# Patient Record
Sex: MALE | Race: WHITE | Employment: FULL TIME | URBAN - METROPOLITAN AREA
[De-identification: names, ages, dates, MRNs, and addresses within clinical notes are randomized per-mention and may not be internally consistent; named-entity substitution may affect disease eponyms.]

---

## 2019-04-20 ENCOUNTER — HOSPITAL ENCOUNTER (EMERGENCY)
Age: 54
Discharge: HOME | End: 2019-04-21
Attending: EMERGENCY MEDICINE
Payer: COMMERCIAL

## 2019-04-20 VITALS
SYSTOLIC BLOOD PRESSURE: 143 MMHG | HEART RATE: 69 BPM | DIASTOLIC BLOOD PRESSURE: 76 MMHG | OXYGEN SATURATION: 97 % | RESPIRATION RATE: 18 BRPM | BODY MASS INDEX: 31.69 KG/M2 | WEIGHT: 234 LBS | HEIGHT: 72 IN

## 2019-04-20 DIAGNOSIS — E16.2 HYPOGLYCEMIA: Primary | ICD-10-CM

## 2019-04-20 LAB
CHP ED QC CHECK: NORMAL
GLUCOSE BLD-MCNC: 81 MG/DL
GLUCOSE BLD-MCNC: 92 MG/DL
GLUCOSE BLD-MCNC: 96 MG/DL
METER GLUCOSE: 213 MG/DL (ref 74–99)
METER GLUCOSE: 80 MG/DL (ref 74–99)
METER GLUCOSE: 81 MG/DL (ref 74–99)
METER GLUCOSE: 92 MG/DL (ref 74–99)
METER GLUCOSE: 96 MG/DL (ref 74–99)

## 2019-04-20 PROCEDURE — 99285 EMERGENCY DEPT VISIT HI MDM: CPT

## 2019-04-20 PROCEDURE — 82962 GLUCOSE BLOOD TEST: CPT

## 2019-04-20 PROCEDURE — 93005 ELECTROCARDIOGRAM TRACING: CPT | Performed by: EMERGENCY MEDICINE

## 2019-04-20 RX ORDER — METOPROLOL SUCCINATE 50 MG
TABLET, EXTENDED RELEASE 24 HR ORAL
COMMUNITY
Start: 2019-03-22

## 2019-04-20 RX ORDER — LISINOPRIL 10 MG/1
TABLET ORAL
Refills: 3 | COMMUNITY
Start: 2019-03-30

## 2019-04-20 ASSESSMENT — ENCOUNTER SYMPTOMS
SHORTNESS OF BREATH: 0
VOMITING: 0
COLOR CHANGE: 0
SORE THROAT: 0
ABDOMINAL PAIN: 0
NAUSEA: 0
COUGH: 0

## 2019-04-20 NOTE — ED PROVIDER NOTES
Maribel Yusuf is a 52 y.o. male with PMH significant for HTN and DM presenting to the emergency department for hypoglycemia. Patient states that he felt diaphoretic, with vision changes and with generalized weakness. He checked his blood sugar and it read low. He called EMS. EMS reported that his blood sugar was 41 on arrival. They gave him half amp of D50 with improvement of his blood sugar in route that showed greater than 120. Patient arrived with blood sugar of 96. On arrival, patient is alert and oriented to himself, year and location. Patient denies any symptoms at this time. Patient admits to history of diabetes as he is on metformin and another diabetic medication, however he cannot read medication. Patient admits to drinking, however no drinking today. The history is provided by the patient. Hypoglycemia   Severity:  Mild  Onset quality:  Sudden  Progression:  Partially resolved  Associated symptoms: sweats and weakness    Associated symptoms: no altered mental status, no seizures, no shortness of breath, no speech difficulty and no vomiting    Risk factors: alcohol abuse        Review of Systems   Constitutional: Positive for diaphoresis and fatigue. Negative for chills and fever. HENT: Negative for congestion and sore throat. Eyes: Negative for visual disturbance. Respiratory: Negative for cough and shortness of breath. Cardiovascular: Negative for chest pain. Gastrointestinal: Negative for abdominal pain, nausea and vomiting. Genitourinary: Negative for difficulty urinating. Musculoskeletal: Negative for neck pain. Skin: Negative for color change. Neurological: Positive for weakness and light-headedness. Negative for seizures, speech difficulty and headaches. Psychiatric/Behavioral: Negative for confusion. Physical Exam   Constitutional: He is oriented to person, place, and time. He appears well-developed and well-nourished. No distress.    HENT:   Head: ---------------------------------------------  Past Medical History:  has a past medical history of Hypertension. Past Surgical History:  has a past surgical history that includes Femur fracture surgery; Tonsillectomy; and ECHO Compl W Dop Color Flow (1/17/2012). Social History:  reports that he has quit smoking. He has never used smokeless tobacco. He reports that he drinks alcohol. He reports that he does not use drugs. Family History: family history includes Heart Disease in his father. The patients home medications have been reviewed. Allergies: Patient has no known allergies.     -------------------------------------------------- RESULTS -------------------------------------------------  Labs:  Results for orders placed or performed during the hospital encounter of 04/20/19   POCT Glucose   Result Value Ref Range    Glucose 96 mg/dL    QC OK? ok    POCT Glucose   Result Value Ref Range    Meter Glucose 96 74 - 99 mg/dL   POCT Glucose   Result Value Ref Range    Glucose 92 mg/dL    QC OK? ok    POCT Glucose   Result Value Ref Range    Glucose 81 mg/dL    QC OK? ok    POCT Glucose   Result Value Ref Range    Meter Glucose 92 74 - 99 mg/dL   POCT Glucose   Result Value Ref Range    Meter Glucose 81 74 - 99 mg/dL   POCT Glucose   Result Value Ref Range    Meter Glucose 80 74 - 99 mg/dL   POCT Glucose   Result Value Ref Range    Meter Glucose 213 (H) 74 - 99 mg/dL   EKG 12 Lead   Result Value Ref Range    Ventricular Rate 72 BPM    Atrial Rate 72 BPM    P-R Interval 280 ms    QRS Duration 160 ms    Q-T Interval 462 ms    QTc Calculation (Bazett) 505 ms    P Axis 54 degrees    R Axis -74 degrees    T Axis 36 degrees       Radiology:  No orders to display         ------------------------- NURSING NOTES AND VITALS REVIEWED ---------------------------  Date / Time Roomed:  4/20/2019  6:57 PM  ED Bed Assignment:  27/27    The nursing notes within the ED encounter and vital signs as below have been reviewed. BP (!) 143/76   Pulse 69   Resp 18   Ht 6' (1.829 m)   Wt 234 lb (106.1 kg)   SpO2 97%   BMI 31.74 kg/m²   Oxygen Saturation Interpretation: Normal      ------------------------------------------ PROGRESS NOTES ------------------------------------------  ED COURSE MEDICATIONS:              Medications - No data to display    I have spoken with the patient and discussed todays results, in addition to providing specific details for the plan of care and counseling regarding the diagnosis and prognosis. Their questions are answered at this time and they are agreeable with the plan. I discussed at length with them reasons for immediate return here for re evaluation. They will followup with primary care by calling their office tomorrow. --------------------------------- ADDITIONAL PROVIDER NOTES ---------------------------------  At this time the patient is without objective evidence of an acute process requiring hospitalization or inpatient management. They have remained hemodynamically stable throughout their entire ED visit and are stable for discharge with outpatient follow-up. The plan has been discussed in detail and they are aware of the specific conditions for emergent return, as well as the importance of follow-up. Discharge Medication List as of 4/20/2019 11:52 PM          Diagnosis:  1. Hypoglycemia        Disposition:  Patient's disposition: Discharge to home  Patient's condition is stable.             Mika Rodgers DO  Resident  04/21/19 Bibi Swift

## 2019-04-21 NOTE — ED NOTES
Notified Dr Taco Walters of [facility].  Provided snacks to mehreen Cota RN  04/20/19 2128

## 2019-04-22 LAB
EKG ATRIAL RATE: 72 BPM
EKG P AXIS: 54 DEGREES
EKG P-R INTERVAL: 280 MS
EKG Q-T INTERVAL: 462 MS
EKG QRS DURATION: 160 MS
EKG QTC CALCULATION (BAZETT): 505 MS
EKG R AXIS: -74 DEGREES
EKG T AXIS: 36 DEGREES
EKG VENTRICULAR RATE: 72 BPM

## 2020-11-23 ENCOUNTER — HOSPITAL ENCOUNTER (EMERGENCY)
Age: 55
Discharge: HOME | End: 2020-11-23

## 2020-11-23 VITALS
OXYGEN SATURATION: 99 % | HEART RATE: 99 BPM | TEMPERATURE: 96.8 F | RESPIRATION RATE: 16 BRPM | SYSTOLIC BLOOD PRESSURE: 148 MMHG | DIASTOLIC BLOOD PRESSURE: 105 MMHG

## 2020-11-23 PROCEDURE — 4500000002 HC ER NO CHARGE

## 2021-05-14 NOTE — ED NOTES
Pt able to ambulate without complications.  Cab called for ride home      Josef Cota RN  04/21/19 5221 Evaluate and treat

## 2022-11-26 ENCOUNTER — APPOINTMENT (OUTPATIENT)
Dept: CT IMAGING | Age: 57
DRG: 871 | End: 2022-11-26

## 2022-11-26 ENCOUNTER — HOSPITAL ENCOUNTER (INPATIENT)
Age: 57
LOS: 4 days | Discharge: HOME | DRG: 871 | End: 2022-11-30
Attending: STUDENT IN AN ORGANIZED HEALTH CARE EDUCATION/TRAINING PROGRAM | Admitting: EMERGENCY MEDICINE

## 2022-11-26 DIAGNOSIS — E83.42 HYPOMAGNESEMIA: ICD-10-CM

## 2022-11-26 DIAGNOSIS — E87.1 HYPONATREMIA: Primary | ICD-10-CM

## 2022-11-26 DIAGNOSIS — R74.01 TRANSAMINITIS: ICD-10-CM

## 2022-11-26 DIAGNOSIS — E80.6 HYPERBILIRUBINEMIA: ICD-10-CM

## 2022-11-26 DIAGNOSIS — E87.6 HYPOKALEMIA: ICD-10-CM

## 2022-11-26 DIAGNOSIS — J96.01 ACUTE RESPIRATORY FAILURE WITH HYPOXIA (HCC): ICD-10-CM

## 2022-11-26 DIAGNOSIS — J18.9 PNEUMONIA DUE TO INFECTIOUS ORGANISM, UNSPECIFIED LATERALITY, UNSPECIFIED PART OF LUNG: ICD-10-CM

## 2022-11-26 DIAGNOSIS — E87.20 LACTIC ACIDOSIS: ICD-10-CM

## 2022-11-26 DIAGNOSIS — R91.1 PULMONARY NODULE: ICD-10-CM

## 2022-11-26 PROBLEM — R55 SYNCOPE AND COLLAPSE: Status: ACTIVE | Noted: 2022-11-26

## 2022-11-26 LAB
ADENOVIRUS BY PCR: NOT DETECTED
ALBUMIN SERPL-MCNC: 4 G/DL (ref 3.5–5.2)
ALP BLD-CCNC: 138 U/L (ref 40–129)
ALT SERPL-CCNC: 77 U/L (ref 0–40)
ANION GAP SERPL CALCULATED.3IONS-SCNC: 16 MMOL/L (ref 7–16)
ANION GAP SERPL CALCULATED.3IONS-SCNC: 20 MMOL/L (ref 7–16)
AST SERPL-CCNC: 83 U/L (ref 0–39)
B.E.: 1.6 MMOL/L (ref -3–3)
BACTERIA: ABNORMAL /HPF
BASOPHILS ABSOLUTE: 0 E9/L (ref 0–0.2)
BASOPHILS RELATIVE PERCENT: 0.1 % (ref 0–2)
BILIRUB SERPL-MCNC: 1.6 MG/DL (ref 0–1.2)
BILIRUBIN URINE: NEGATIVE
BLOOD, URINE: NEGATIVE
BORDETELLA PARAPERTUSSIS BY PCR: NOT DETECTED
BORDETELLA PERTUSSIS BY PCR: NOT DETECTED
BUN BLDV-MCNC: 11 MG/DL (ref 6–20)
BUN BLDV-MCNC: 9 MG/DL (ref 6–20)
BURR CELLS: ABNORMAL
CALCIUM SERPL-MCNC: 8.2 MG/DL (ref 8.6–10.2)
CALCIUM SERPL-MCNC: 9.3 MG/DL (ref 8.6–10.2)
CHLAMYDOPHILIA PNEUMONIAE BY PCR: NOT DETECTED
CHLORIDE BLD-SCNC: 78 MMOL/L (ref 98–107)
CHLORIDE BLD-SCNC: 81 MMOL/L (ref 98–107)
CHLORIDE URINE RANDOM: <20 MMOL/L
CLARITY: CLEAR
CO2: 22 MMOL/L (ref 22–29)
CO2: 25 MMOL/L (ref 22–29)
COHB: 2.1 % (ref 0–1.5)
COLOR: YELLOW
CORONAVIRUS 229E BY PCR: NOT DETECTED
CORONAVIRUS HKU1 BY PCR: NOT DETECTED
CORONAVIRUS NL63 BY PCR: NOT DETECTED
CORONAVIRUS OC43 BY PCR: NOT DETECTED
CREAT SERPL-MCNC: 1 MG/DL (ref 0.7–1.2)
CREAT SERPL-MCNC: 1.1 MG/DL (ref 0.7–1.2)
CRITICAL: ABNORMAL
DATE ANALYZED: ABNORMAL
DATE OF COLLECTION: ABNORMAL
EOSINOPHILS ABSOLUTE: 0 E9/L (ref 0.05–0.5)
EOSINOPHILS RELATIVE PERCENT: 0 % (ref 0–6)
GFR SERPL CREATININE-BSD FRML MDRD: >60 ML/MIN/1.73
GFR SERPL CREATININE-BSD FRML MDRD: >60 ML/MIN/1.73
GLUCOSE BLD-MCNC: 175 MG/DL (ref 74–99)
GLUCOSE BLD-MCNC: 222 MG/DL (ref 74–99)
GLUCOSE URINE: NEGATIVE MG/DL
HCO3: 23.3 MMOL/L (ref 22–26)
HCT VFR BLD CALC: 42.3 % (ref 37–54)
HEMOGLOBIN: 15.6 G/DL (ref 12.5–16.5)
HHB: 7.5 % (ref 0–5)
HUMAN METAPNEUMOVIRUS BY PCR: NOT DETECTED
HUMAN RHINOVIRUS/ENTEROVIRUS BY PCR: NOT DETECTED
INFLUENZA A BY PCR: NOT DETECTED
INFLUENZA A BY PCR: NOT DETECTED
INFLUENZA B BY PCR: NOT DETECTED
INFLUENZA B BY PCR: NOT DETECTED
KETONES, URINE: 15 MG/DL
LAB: ABNORMAL
LACTIC ACID: 1.9 MMOL/L (ref 0.5–2.2)
LACTIC ACID: 4.5 MMOL/L (ref 0.5–2.2)
LEUKOCYTE ESTERASE, URINE: NEGATIVE
LIPASE: 98 U/L (ref 13–60)
LYMPHOCYTES ABSOLUTE: 0.09 E9/L (ref 1.5–4)
LYMPHOCYTES RELATIVE PERCENT: 0.9 % (ref 20–42)
Lab: ABNORMAL
MAGNESIUM: 1.4 MG/DL (ref 1.6–2.6)
MAGNESIUM: 1.9 MG/DL (ref 1.6–2.6)
MCH RBC QN AUTO: 31.6 PG (ref 26–35)
MCHC RBC AUTO-ENTMCNC: 36.9 % (ref 32–34.5)
MCV RBC AUTO: 85.6 FL (ref 80–99.9)
METHB: 0.3 % (ref 0–1.5)
MODE: ABNORMAL
MONOCYTES ABSOLUTE: 0.28 E9/L (ref 0.1–0.95)
MONOCYTES RELATIVE PERCENT: 2.6 % (ref 2–12)
MYCOPLASMA PNEUMONIAE BY PCR: NOT DETECTED
NEUTROPHILS ABSOLUTE: 9.12 E9/L (ref 1.8–7.3)
NEUTROPHILS RELATIVE PERCENT: 96.5 % (ref 43–80)
NITRITE, URINE: NEGATIVE
O2 CONTENT: 19.4 ML/DL
O2 SATURATION: 92.3 % (ref 92–98.5)
O2HB: 90.1 % (ref 94–97)
OPERATOR ID: 366
OSMOLALITY URINE: 289 MOSM/KG (ref 300–900)
OSMOLALITY: 254 MOSM/KG (ref 285–310)
OVALOCYTES: ABNORMAL
PARAINFLUENZA VIRUS 1 BY PCR: NOT DETECTED
PARAINFLUENZA VIRUS 2 BY PCR: NOT DETECTED
PARAINFLUENZA VIRUS 3 BY PCR: NOT DETECTED
PARAINFLUENZA VIRUS 4 BY PCR: NOT DETECTED
PATIENT TEMP: 37 C
PCO2: 29.2 MMHG (ref 35–45)
PDW BLD-RTO: 11.9 FL (ref 11.5–15)
PH BLOOD GAS: 7.52 (ref 7.35–7.45)
PH UA: 6.5 (ref 5–9)
PLATELET # BLD: 129 E9/L (ref 130–450)
PMV BLD AUTO: 9.4 FL (ref 7–12)
PO2: 61.8 MMHG (ref 75–100)
POIKILOCYTES: ABNORMAL
POTASSIUM REFLEX MAGNESIUM: 3.2 MMOL/L (ref 3.5–5)
POTASSIUM REFLEX MAGNESIUM: 3.2 MMOL/L (ref 3.5–5)
POTASSIUM, UR: 62.4 MMOL/L
PRO-BNP: 161 PG/ML (ref 0–125)
PROCALCITONIN: 2.26 NG/ML (ref 0–0.08)
PROTEIN UA: NEGATIVE MG/DL
RBC # BLD: 4.94 E12/L (ref 3.8–5.8)
RBC UA: ABNORMAL /HPF (ref 0–2)
RESPIRATORY SYNCYTIAL VIRUS BY PCR: NOT DETECTED
SARS-COV-2, NAAT: NOT DETECTED
SARS-COV-2, PCR: NOT DETECTED
SODIUM BLD-SCNC: 119 MMOL/L (ref 132–146)
SODIUM BLD-SCNC: 123 MMOL/L (ref 132–146)
SODIUM URINE: 23 MMOL/L
SOURCE, BLOOD GAS: ABNORMAL
SPECIFIC GRAVITY UA: 1.01 (ref 1–1.03)
THB: 15.3 G/DL (ref 11.5–16.5)
TIME ANALYZED: 1446
TOTAL PROTEIN: 8.7 G/DL (ref 6.4–8.3)
TROPONIN, HIGH SENSITIVITY: 16 NG/L (ref 0–11)
TROPONIN, HIGH SENSITIVITY: 16 NG/L (ref 0–11)
UROBILINOGEN, URINE: 4 E.U./DL
WBC # BLD: 9.4 E9/L (ref 4.5–11.5)
WBC UA: ABNORMAL /HPF (ref 0–5)

## 2022-11-26 PROCEDURE — 87635 SARS-COV-2 COVID-19 AMP PRB: CPT

## 2022-11-26 PROCEDURE — 84484 ASSAY OF TROPONIN QUANT: CPT

## 2022-11-26 PROCEDURE — 93005 ELECTROCARDIOGRAM TRACING: CPT | Performed by: STUDENT IN AN ORGANIZED HEALTH CARE EDUCATION/TRAINING PROGRAM

## 2022-11-26 PROCEDURE — 80053 COMPREHEN METABOLIC PANEL: CPT

## 2022-11-26 PROCEDURE — 87040 BLOOD CULTURE FOR BACTERIA: CPT

## 2022-11-26 PROCEDURE — 87088 URINE BACTERIA CULTURE: CPT

## 2022-11-26 PROCEDURE — 0202U NFCT DS 22 TRGT SARS-COV-2: CPT

## 2022-11-26 PROCEDURE — 6360000002 HC RX W HCPCS: Performed by: STUDENT IN AN ORGANIZED HEALTH CARE EDUCATION/TRAINING PROGRAM

## 2022-11-26 PROCEDURE — 2000000000 HC ICU R&B

## 2022-11-26 PROCEDURE — 80048 BASIC METABOLIC PNL TOTAL CA: CPT

## 2022-11-26 PROCEDURE — 85025 COMPLETE CBC W/AUTO DIFF WBC: CPT

## 2022-11-26 PROCEDURE — 96375 TX/PRO/DX INJ NEW DRUG ADDON: CPT

## 2022-11-26 PROCEDURE — 99285 EMERGENCY DEPT VISIT HI MDM: CPT

## 2022-11-26 PROCEDURE — 71275 CT ANGIOGRAPHY CHEST: CPT

## 2022-11-26 PROCEDURE — 6370000000 HC RX 637 (ALT 250 FOR IP): Performed by: STUDENT IN AN ORGANIZED HEALTH CARE EDUCATION/TRAINING PROGRAM

## 2022-11-26 PROCEDURE — 96374 THER/PROPH/DIAG INJ IV PUSH: CPT

## 2022-11-26 PROCEDURE — 82805 BLOOD GASES W/O2 SATURATION: CPT

## 2022-11-26 PROCEDURE — 83880 ASSAY OF NATRIURETIC PEPTIDE: CPT

## 2022-11-26 PROCEDURE — 83735 ASSAY OF MAGNESIUM: CPT

## 2022-11-26 PROCEDURE — 2580000003 HC RX 258: Performed by: STUDENT IN AN ORGANIZED HEALTH CARE EDUCATION/TRAINING PROGRAM

## 2022-11-26 PROCEDURE — 2580000003 HC RX 258: Performed by: EMERGENCY MEDICINE

## 2022-11-26 PROCEDURE — 82436 ASSAY OF URINE CHLORIDE: CPT

## 2022-11-26 PROCEDURE — 6360000002 HC RX W HCPCS

## 2022-11-26 PROCEDURE — 74177 CT ABD & PELVIS W/CONTRAST: CPT

## 2022-11-26 PROCEDURE — 84133 ASSAY OF URINE POTASSIUM: CPT

## 2022-11-26 PROCEDURE — 2500000003 HC RX 250 WO HCPCS: Performed by: STUDENT IN AN ORGANIZED HEALTH CARE EDUCATION/TRAINING PROGRAM

## 2022-11-26 PROCEDURE — 84300 ASSAY OF URINE SODIUM: CPT

## 2022-11-26 PROCEDURE — 87502 INFLUENZA DNA AMP PROBE: CPT

## 2022-11-26 PROCEDURE — 6360000002 HC RX W HCPCS: Performed by: EMERGENCY MEDICINE

## 2022-11-26 PROCEDURE — 83605 ASSAY OF LACTIC ACID: CPT

## 2022-11-26 PROCEDURE — 83935 ASSAY OF URINE OSMOLALITY: CPT

## 2022-11-26 PROCEDURE — 83930 ASSAY OF BLOOD OSMOLALITY: CPT

## 2022-11-26 PROCEDURE — 81001 URINALYSIS AUTO W/SCOPE: CPT

## 2022-11-26 PROCEDURE — 6370000000 HC RX 637 (ALT 250 FOR IP): Performed by: INTERNAL MEDICINE

## 2022-11-26 PROCEDURE — 84145 PROCALCITONIN (PCT): CPT

## 2022-11-26 PROCEDURE — 83690 ASSAY OF LIPASE: CPT

## 2022-11-26 PROCEDURE — 6360000004 HC RX CONTRAST MEDICATION: Performed by: RADIOLOGY

## 2022-11-26 PROCEDURE — 70450 CT HEAD/BRAIN W/O DYE: CPT

## 2022-11-26 RX ORDER — ENOXAPARIN SODIUM 100 MG/ML
40 INJECTION SUBCUTANEOUS DAILY
Status: DISCONTINUED | OUTPATIENT
Start: 2022-11-26 | End: 2022-11-30 | Stop reason: HOSPADM

## 2022-11-26 RX ORDER — LORAZEPAM 2 MG/ML
4 INJECTION INTRAMUSCULAR
Status: DISCONTINUED | OUTPATIENT
Start: 2022-11-26 | End: 2022-11-30 | Stop reason: HOSPADM

## 2022-11-26 RX ORDER — AMLODIPINE BESYLATE 10 MG/1
10 TABLET ORAL EVERY MORNING
Status: ON HOLD | COMMUNITY
End: 2022-11-30 | Stop reason: HOSPADM

## 2022-11-26 RX ORDER — LISINOPRIL AND HYDROCHLOROTHIAZIDE 25; 20 MG/1; MG/1
1 TABLET ORAL EVERY MORNING
Status: ON HOLD | COMMUNITY
End: 2022-11-30 | Stop reason: HOSPADM

## 2022-11-26 RX ORDER — ONDANSETRON 4 MG/1
4 TABLET, ORALLY DISINTEGRATING ORAL EVERY 8 HOURS PRN
Status: DISCONTINUED | OUTPATIENT
Start: 2022-11-26 | End: 2022-11-27

## 2022-11-26 RX ORDER — ACETAMINOPHEN 650 MG/1
650 SUPPOSITORY RECTAL EVERY 6 HOURS PRN
Status: DISCONTINUED | OUTPATIENT
Start: 2022-11-26 | End: 2022-11-26 | Stop reason: SDUPTHER

## 2022-11-26 RX ORDER — POLYETHYLENE GLYCOL 3350 17 G/17G
17 POWDER, FOR SOLUTION ORAL DAILY PRN
Status: DISCONTINUED | OUTPATIENT
Start: 2022-11-26 | End: 2022-11-26

## 2022-11-26 RX ORDER — ATORVASTATIN CALCIUM 40 MG/1
40 TABLET, FILM COATED ORAL EVERY MORNING
COMMUNITY

## 2022-11-26 RX ORDER — LORAZEPAM 1 MG/1
3 TABLET ORAL
Status: DISCONTINUED | OUTPATIENT
Start: 2022-11-26 | End: 2022-11-30 | Stop reason: HOSPADM

## 2022-11-26 RX ORDER — POLYETHYLENE GLYCOL 3350 17 G/17G
17 POWDER, FOR SOLUTION ORAL DAILY PRN
Status: DISCONTINUED | OUTPATIENT
Start: 2022-11-26 | End: 2022-11-26 | Stop reason: SDUPTHER

## 2022-11-26 RX ORDER — ONDANSETRON 2 MG/ML
4 INJECTION INTRAMUSCULAR; INTRAVENOUS EVERY 6 HOURS PRN
Status: DISCONTINUED | OUTPATIENT
Start: 2022-11-26 | End: 2022-11-27

## 2022-11-26 RX ORDER — THIAMINE HYDROCHLORIDE 100 MG/ML
500 INJECTION, SOLUTION INTRAMUSCULAR; INTRAVENOUS ONCE
Status: COMPLETED | OUTPATIENT
Start: 2022-11-26 | End: 2022-11-26

## 2022-11-26 RX ORDER — LORAZEPAM 1 MG/1
4 TABLET ORAL
Status: DISCONTINUED | OUTPATIENT
Start: 2022-11-26 | End: 2022-11-30 | Stop reason: HOSPADM

## 2022-11-26 RX ORDER — DEXTROSE AND SODIUM CHLORIDE 5; .9 G/100ML; G/100ML
INJECTION, SOLUTION INTRAVENOUS CONTINUOUS
Status: DISCONTINUED | OUTPATIENT
Start: 2022-11-26 | End: 2022-11-26

## 2022-11-26 RX ORDER — SODIUM CHLORIDE 0.9 % (FLUSH) 0.9 %
5-40 SYRINGE (ML) INJECTION EVERY 12 HOURS SCHEDULED
Status: DISCONTINUED | OUTPATIENT
Start: 2022-11-26 | End: 2022-11-28

## 2022-11-26 RX ORDER — LORAZEPAM 2 MG/ML
3 INJECTION INTRAMUSCULAR
Status: DISCONTINUED | OUTPATIENT
Start: 2022-11-26 | End: 2022-11-30 | Stop reason: HOSPADM

## 2022-11-26 RX ORDER — MULTIVITAMIN WITH IRON
1 TABLET ORAL DAILY
Status: DISCONTINUED | OUTPATIENT
Start: 2022-11-26 | End: 2022-11-30 | Stop reason: HOSPADM

## 2022-11-26 RX ORDER — POLYETHYLENE GLYCOL 3350 17 G/17G
17 POWDER, FOR SOLUTION ORAL DAILY PRN
Status: DISCONTINUED | OUTPATIENT
Start: 2022-11-26 | End: 2022-11-30 | Stop reason: HOSPADM

## 2022-11-26 RX ORDER — SODIUM CHLORIDE 0.9 % (FLUSH) 0.9 %
5-40 SYRINGE (ML) INJECTION EVERY 12 HOURS SCHEDULED
Status: DISCONTINUED | OUTPATIENT
Start: 2022-11-27 | End: 2022-11-28

## 2022-11-26 RX ORDER — ONDANSETRON 4 MG/1
4 TABLET, ORALLY DISINTEGRATING ORAL EVERY 8 HOURS PRN
Status: DISCONTINUED | OUTPATIENT
Start: 2022-11-26 | End: 2022-11-26

## 2022-11-26 RX ORDER — INSULIN LISPRO 100 [IU]/ML
0-4 INJECTION, SOLUTION INTRAVENOUS; SUBCUTANEOUS
Status: DISCONTINUED | OUTPATIENT
Start: 2022-11-27 | End: 2022-11-30 | Stop reason: HOSPADM

## 2022-11-26 RX ORDER — ACETAMINOPHEN 650 MG/1
650 SUPPOSITORY RECTAL EVERY 6 HOURS PRN
Status: DISCONTINUED | OUTPATIENT
Start: 2022-11-26 | End: 2022-11-30 | Stop reason: HOSPADM

## 2022-11-26 RX ORDER — SODIUM CHLORIDE 0.9 % (FLUSH) 0.9 %
5-40 SYRINGE (ML) INJECTION PRN
Status: DISCONTINUED | OUTPATIENT
Start: 2022-11-26 | End: 2022-11-28

## 2022-11-26 RX ORDER — SODIUM CHLORIDE 9 MG/ML
INJECTION, SOLUTION INTRAVENOUS PRN
Status: DISCONTINUED | OUTPATIENT
Start: 2022-11-26 | End: 2022-11-26 | Stop reason: SDUPTHER

## 2022-11-26 RX ORDER — SODIUM CHLORIDE 9 MG/ML
INJECTION, SOLUTION INTRAVENOUS PRN
Status: DISCONTINUED | OUTPATIENT
Start: 2022-11-26 | End: 2022-11-28

## 2022-11-26 RX ORDER — LANOLIN ALCOHOL/MO/W.PET/CERES
100 CREAM (GRAM) TOPICAL DAILY
Status: DISCONTINUED | OUTPATIENT
Start: 2022-11-26 | End: 2022-11-26

## 2022-11-26 RX ORDER — METOPROLOL SUCCINATE 50 MG/1
50 TABLET, EXTENDED RELEASE ORAL EVERY MORNING
Status: ON HOLD | COMMUNITY
End: 2022-11-30 | Stop reason: HOSPADM

## 2022-11-26 RX ORDER — ACETAMINOPHEN 325 MG/1
650 TABLET ORAL EVERY 6 HOURS PRN
Status: DISCONTINUED | OUTPATIENT
Start: 2022-11-26 | End: 2022-11-26 | Stop reason: SDUPTHER

## 2022-11-26 RX ORDER — DEXAMETHASONE SODIUM PHOSPHATE 10 MG/ML
10 INJECTION INTRAMUSCULAR; INTRAVENOUS ONCE
Status: COMPLETED | OUTPATIENT
Start: 2022-11-26 | End: 2022-11-26

## 2022-11-26 RX ORDER — ONDANSETRON 2 MG/ML
4 INJECTION INTRAMUSCULAR; INTRAVENOUS EVERY 6 HOURS PRN
Status: DISCONTINUED | OUTPATIENT
Start: 2022-11-26 | End: 2022-11-26

## 2022-11-26 RX ORDER — POTASSIUM CHLORIDE 20 MEQ/1
40 TABLET, EXTENDED RELEASE ORAL ONCE
Status: COMPLETED | OUTPATIENT
Start: 2022-11-26 | End: 2022-11-26

## 2022-11-26 RX ORDER — LORAZEPAM 2 MG/ML
1 INJECTION INTRAMUSCULAR
Status: DISCONTINUED | OUTPATIENT
Start: 2022-11-26 | End: 2022-11-30 | Stop reason: HOSPADM

## 2022-11-26 RX ORDER — VITAMIN B COMPLEX
1000 TABLET ORAL EVERY MORNING
Status: DISCONTINUED | OUTPATIENT
Start: 2022-11-27 | End: 2022-11-30 | Stop reason: HOSPADM

## 2022-11-26 RX ORDER — LORAZEPAM 1 MG/1
1 TABLET ORAL
Status: DISCONTINUED | OUTPATIENT
Start: 2022-11-26 | End: 2022-11-30 | Stop reason: HOSPADM

## 2022-11-26 RX ORDER — ENOXAPARIN SODIUM 100 MG/ML
40 INJECTION SUBCUTANEOUS DAILY
Status: DISCONTINUED | OUTPATIENT
Start: 2022-11-26 | End: 2022-11-26 | Stop reason: SDUPTHER

## 2022-11-26 RX ORDER — ACETAMINOPHEN 325 MG/1
650 TABLET ORAL EVERY 6 HOURS PRN
Status: DISCONTINUED | OUTPATIENT
Start: 2022-11-26 | End: 2022-11-30 | Stop reason: HOSPADM

## 2022-11-26 RX ORDER — ONDANSETRON 2 MG/ML
4 INJECTION INTRAMUSCULAR; INTRAVENOUS EVERY 6 HOURS PRN
Status: DISCONTINUED | OUTPATIENT
Start: 2022-11-26 | End: 2022-11-26 | Stop reason: SDUPTHER

## 2022-11-26 RX ORDER — KETOROLAC TROMETHAMINE 30 MG/ML
15 INJECTION, SOLUTION INTRAMUSCULAR; INTRAVENOUS ONCE
Status: COMPLETED | OUTPATIENT
Start: 2022-11-26 | End: 2022-11-26

## 2022-11-26 RX ORDER — POTASSIUM CHLORIDE 20 MEQ/1
20 TABLET, EXTENDED RELEASE ORAL ONCE
Status: COMPLETED | OUTPATIENT
Start: 2022-11-26 | End: 2022-11-26

## 2022-11-26 RX ORDER — DEXTROSE MONOHYDRATE 100 MG/ML
INJECTION, SOLUTION INTRAVENOUS CONTINUOUS PRN
Status: DISCONTINUED | OUTPATIENT
Start: 2022-11-26 | End: 2022-11-30 | Stop reason: HOSPADM

## 2022-11-26 RX ORDER — INSULIN LISPRO 100 [IU]/ML
0-4 INJECTION, SOLUTION INTRAVENOUS; SUBCUTANEOUS NIGHTLY
Status: DISCONTINUED | OUTPATIENT
Start: 2022-11-27 | End: 2022-11-30 | Stop reason: HOSPADM

## 2022-11-26 RX ORDER — POTASSIUM CHLORIDE 7.45 MG/ML
10 INJECTION INTRAVENOUS ONCE
Status: COMPLETED | OUTPATIENT
Start: 2022-11-26 | End: 2022-11-26

## 2022-11-26 RX ORDER — SODIUM CHLORIDE 0.9 % (FLUSH) 0.9 %
5-40 SYRINGE (ML) INJECTION EVERY 12 HOURS SCHEDULED
Status: DISCONTINUED | OUTPATIENT
Start: 2022-11-26 | End: 2022-11-30 | Stop reason: HOSPADM

## 2022-11-26 RX ORDER — AMLODIPINE BESYLATE 10 MG/1
10 TABLET ORAL EVERY MORNING
Status: DISCONTINUED | OUTPATIENT
Start: 2022-11-27 | End: 2022-11-30 | Stop reason: HOSPADM

## 2022-11-26 RX ORDER — ONDANSETRON 4 MG/1
4 TABLET, ORALLY DISINTEGRATING ORAL EVERY 8 HOURS PRN
Status: DISCONTINUED | OUTPATIENT
Start: 2022-11-26 | End: 2022-11-26 | Stop reason: SDUPTHER

## 2022-11-26 RX ORDER — LORAZEPAM 2 MG/ML
2 INJECTION INTRAMUSCULAR
Status: DISCONTINUED | OUTPATIENT
Start: 2022-11-26 | End: 2022-11-30 | Stop reason: HOSPADM

## 2022-11-26 RX ORDER — FOLIC ACID 1 MG/1
1 TABLET ORAL ONCE
Status: COMPLETED | OUTPATIENT
Start: 2022-11-26 | End: 2022-11-26

## 2022-11-26 RX ORDER — 0.9 % SODIUM CHLORIDE 0.9 %
1000 INTRAVENOUS SOLUTION INTRAVENOUS ONCE
Status: DISCONTINUED | OUTPATIENT
Start: 2022-11-26 | End: 2022-11-26

## 2022-11-26 RX ORDER — METOPROLOL SUCCINATE 50 MG/1
50 TABLET, EXTENDED RELEASE ORAL EVERY MORNING
Status: DISCONTINUED | OUTPATIENT
Start: 2022-11-27 | End: 2022-11-30 | Stop reason: HOSPADM

## 2022-11-26 RX ORDER — LORAZEPAM 1 MG/1
2 TABLET ORAL
Status: DISCONTINUED | OUTPATIENT
Start: 2022-11-26 | End: 2022-11-30 | Stop reason: HOSPADM

## 2022-11-26 RX ORDER — MAGNESIUM SULFATE IN WATER 40 MG/ML
2000 INJECTION, SOLUTION INTRAVENOUS ONCE
Status: COMPLETED | OUTPATIENT
Start: 2022-11-26 | End: 2022-11-26

## 2022-11-26 RX ORDER — ATORVASTATIN CALCIUM 40 MG/1
40 TABLET, FILM COATED ORAL EVERY MORNING
Status: DISCONTINUED | OUTPATIENT
Start: 2022-11-27 | End: 2022-11-30 | Stop reason: HOSPADM

## 2022-11-26 RX ADMIN — DEXAMETHASONE SODIUM PHOSPHATE 10 MG: 10 INJECTION INTRAMUSCULAR; INTRAVENOUS at 16:47

## 2022-11-26 RX ADMIN — SODIUM CHLORIDE, PRESERVATIVE FREE 10 ML: 5 INJECTION INTRAVENOUS at 21:46

## 2022-11-26 RX ADMIN — DEXTROSE AND SODIUM CHLORIDE: 5; 900 INJECTION, SOLUTION INTRAVENOUS at 17:08

## 2022-11-26 RX ADMIN — MAGNESIUM SULFATE HEPTAHYDRATE 2000 MG: 40 INJECTION, SOLUTION INTRAVENOUS at 17:00

## 2022-11-26 RX ADMIN — Medication 100 MG: at 16:45

## 2022-11-26 RX ADMIN — THIAMINE HYDROCHLORIDE 500 MG: 100 INJECTION, SOLUTION INTRAMUSCULAR; INTRAVENOUS at 18:15

## 2022-11-26 RX ADMIN — POTASSIUM CHLORIDE 20 MEQ: 1500 TABLET, EXTENDED RELEASE ORAL at 16:45

## 2022-11-26 RX ADMIN — KETOROLAC TROMETHAMINE 15 MG: 30 INJECTION, SOLUTION INTRAMUSCULAR; INTRAVENOUS at 16:48

## 2022-11-26 RX ADMIN — FOLIC ACID 1 MG: 1 TABLET ORAL at 18:26

## 2022-11-26 RX ADMIN — CEFTRIAXONE 2000 MG: 2 INJECTION, POWDER, FOR SOLUTION INTRAMUSCULAR; INTRAVENOUS at 16:53

## 2022-11-26 RX ADMIN — POTASSIUM CHLORIDE 40 MEQ: 1500 TABLET, EXTENDED RELEASE ORAL at 21:42

## 2022-11-26 RX ADMIN — PHENOBARBITAL SODIUM 130 MG: 65 INJECTION INTRAMUSCULAR at 17:45

## 2022-11-26 RX ADMIN — DEXTROSE AND SODIUM CHLORIDE: 5; 900 INJECTION, SOLUTION INTRAVENOUS at 21:04

## 2022-11-26 RX ADMIN — ENOXAPARIN SODIUM 40 MG: 100 INJECTION SUBCUTANEOUS at 21:41

## 2022-11-26 RX ADMIN — DOXYCYCLINE 100 MG: 100 INJECTION, POWDER, LYOPHILIZED, FOR SOLUTION INTRAVENOUS at 17:08

## 2022-11-26 RX ADMIN — MULTIVITAMIN TABLET 1 TABLET: TABLET at 16:45

## 2022-11-26 RX ADMIN — IOPAMIDOL 90 ML: 755 INJECTION, SOLUTION INTRAVENOUS at 14:55

## 2022-11-26 RX ADMIN — POTASSIUM CHLORIDE 10 MEQ: 7.46 INJECTION, SOLUTION INTRAVENOUS at 17:00

## 2022-11-26 RX ADMIN — TRIMETHOBENZAMIDE HYDROCHLORIDE 200 MG: 100 INJECTION INTRAMUSCULAR at 17:40

## 2022-11-26 ASSESSMENT — PAIN - FUNCTIONAL ASSESSMENT: PAIN_FUNCTIONAL_ASSESSMENT: NONE - DENIES PAIN

## 2022-11-26 ASSESSMENT — PAIN SCALES - GENERAL: PAINLEVEL_OUTOF10: 0

## 2022-11-26 NOTE — ED NOTES
Patient to ER via EMS, 4 syncopal events with vomiting yesterday and today. EMS noted orthostatic HPTN when standing patient, he then had a syncopal event and vomited. EMS unable to get SP02 above 90.      Breezy Hung RN  11/26/22 1664

## 2022-11-26 NOTE — ED PROVIDER NOTES
Department of Emergency Medicine   ED  Provider Note  Admit Date/RoomTime: 11/26/2022  2:21 PM  ED Room: LOBO/LOBO          History of Present Illness:  11/26/22, Time: 2:31 PM EST  Chief Complaint   Patient presents with    Loss of Consciousness     Patient having multiple syncopal episodes w/ vomiting . Patient orthostatic positive per EMS        Noel Gaxiola is a 59 y.o. male presenting to the ED for syncope, beginning yesterday. The complaint has been persistent, moderate in severity, and worsened by nothing. Patient is a 77-year-old male who presents to the emergency department complaining of syncope. The patient symptoms were sudden in onset yesterday, has been persistent, moderate in severity, and nothing makes it better or worse. He is a poor historian and states that he just keeps falling. However, wife states he has been passing out intermittently since yesterday. EMS said that orthostatics were positive for them. Per chart review patient does have history of alcohol abuse and patient admits to drinking 12-18 beers per day. His last drink was yesterday morning. He states that he is not currently withdrawing and does not have a history of withdrawal seizures. However, there is a history of DTs listed in his chart review. Patient states he is just not been feeling well over the past couple of days and has multiple episodes of nausea, vomiting, diarrhea. He denies any blood thinner use, hematemesis, hematochezia, melena, chest pain, numbness, tingling, unilateral weakness, injuries, falls, or other acute symptoms or concerns. Review of Systems:   A complete review of systems was performed and pertinent positives and negatives are stated within HPI, all other systems reviewed and are negative.        --------------------------------------------- PAST HISTORY ---------------------------------------------  Past Medical History:  has a past medical history of Hypertension.     Past orders placed or performed during the hospital encounter of 11/26/22   COVID-19, Rapid    Specimen: Nasopharyngeal Swab   Result Value Ref Range    SARS-CoV-2, NAAT Not Detected Not Detected   Rapid influenza A/B antigens    Specimen: Nasopharyngeal   Result Value Ref Range    Influenza A by PCR Not Detected Not Detected    Influenza B by PCR Not Detected Not Detected   Respiratory Panel, Molecular, with COVID-19 (Restricted: peds pts or suitable admitted adults)    Specimen: Nasopharyngeal   Result Value Ref Range    Adenovirus by PCR Not Detected Not Detected    Bordetella parapertussis by PCR Not Detected Not Detected    Bordetella pertussis by PCR Not Detected Not Detected    Chlamydophilia pneumoniae by PCR Not Detected Not Detected    Coronavirus 229E by PCR Not Detected Not Detected    Coronavirus HKU1 by PCR Not Detected Not Detected    Coronavirus NL63 by PCR Not Detected Not Detected    Coronavirus OC43 by PCR Not Detected Not Detected    SARS-CoV-2, PCR Not Detected Not Detected    Human Metapneumovirus by PCR Not Detected Not Detected    Human Rhinovirus/Enterovirus by PCR Not Detected Not Detected    Influenza A by PCR Not Detected Not Detected    Influenza B by PCR Not Detected Not Detected    Mycoplasma pneumoniae by PCR Not Detected Not Detected    Parainfluenza Virus 1 by PCR Not Detected Not Detected    Parainfluenza Virus 2 by PCR Not Detected Not Detected    Parainfluenza Virus 3 by PCR Not Detected Not Detected    Parainfluenza Virus 4 by PCR Not Detected Not Detected    Respiratory Syncytial Virus by PCR Not Detected Not Detected   CBC with Auto Differential   Result Value Ref Range    WBC 9.4 4.5 - 11.5 E9/L    RBC 4.94 3.80 - 5.80 E12/L    Hemoglobin 15.6 12.5 - 16.5 g/dL    Hematocrit 42.3 37.0 - 54.0 %    MCV 85.6 80.0 - 99.9 fL    MCH 31.6 26.0 - 35.0 pg    MCHC 36.9 (H) 32.0 - 34.5 %    RDW 11.9 11.5 - 15.0 fL    Platelets 172 (L) 700 - 450 E9/L    MPV 9.4 7.0 - 12.0 fL    Neutrophils % 96. 5 (H) 43.0 - 80.0 %    Lymphocytes % 0.9 (L) 20.0 - 42.0 %    Monocytes % 2.6 2.0 - 12.0 %    Eosinophils % 0.0 0.0 - 6.0 %    Basophils % 0.1 0.0 - 2.0 %    Neutrophils Absolute 9.12 (H) 1.80 - 7.30 E9/L    Lymphocytes Absolute 0.09 (L) 1.50 - 4.00 E9/L    Monocytes Absolute 0.28 0.10 - 0.95 E9/L    Eosinophils Absolute 0.00 (L) 0.05 - 0.50 E9/L    Basophils Absolute 0.00 0.00 - 0.20 E9/L    Poikilocytes 1+     Stevenson Cells 1+     Ovalocytes 1+    Comprehensive Metabolic Panel w/ Reflex to MG   Result Value Ref Range    Sodium 123 (L) 132 - 146 mmol/L    Potassium reflex Magnesium 3.2 (L) 3.5 - 5.0 mmol/L    Chloride 78 (L) 98 - 107 mmol/L    CO2 25 22 - 29 mmol/L    Anion Gap 20 (H) 7 - 16 mmol/L    Glucose 175 (H) 74 - 99 mg/dL    BUN 9 6 - 20 mg/dL    Creatinine 1.1 0.7 - 1.2 mg/dL    Est, Glom Filt Rate >60 >=60 mL/min/1.73    Calcium 9.3 8.6 - 10.2 mg/dL    Total Protein 8.7 (H) 6.4 - 8.3 g/dL    Albumin 4.0 3.5 - 5.2 g/dL    Total Bilirubin 1.6 (H) 0.0 - 1.2 mg/dL    Alkaline Phosphatase 138 (H) 40 - 129 U/L    ALT 77 (H) 0 - 40 U/L    AST 83 (H) 0 - 39 U/L   Troponin   Result Value Ref Range    Troponin, High Sensitivity 16 (H) 0 - 11 ng/L   Brain Natriuretic Peptide   Result Value Ref Range    Pro- (H) 0 - 125 pg/mL   Lipase   Result Value Ref Range    Lipase 98 (H) 13 - 60 U/L   Lactic Acid   Result Value Ref Range    Lactic Acid 4.5 (HH) 0.5 - 2.2 mmol/L   Urinalysis with Microscopic   Result Value Ref Range    Color, UA Yellow Straw/Yellow    Clarity, UA Clear Clear    Glucose, Ur Negative Negative mg/dL    Bilirubin Urine Negative Negative    Ketones, Urine 15 (A) Negative mg/dL    Specific Gravity, UA 1.015 1.005 - 1.030    Blood, Urine Negative Negative    pH, UA 6.5 5.0 - 9.0    Protein, UA Negative Negative mg/dL    Urobilinogen, Urine 4.0 (A) <2.0 E.U./dL    Nitrite, Urine Negative Negative    Leukocyte Esterase, Urine Negative Negative    WBC, UA NONE 0 - 5 /HPF    RBC, UA 0-1 0 - 2 /HPF Bacteria, UA RARE (A) None Seen /HPF   Blood Gas, Arterial   Result Value Ref Range    Date Analyzed 20221126     Time Analyzed 1446     Source: Blood Arterial     pH, Blood Gas 7.520 (H) 7.350 - 7.450    PCO2 29.2 (L) 35.0 - 45.0 mmHg    PO2 61.8 (L) 75.0 - 100.0 mmHg    HCO3 23.3 22.0 - 26.0 mmol/L    B.E. 1.6 -3.0 - 3.0 mmol/L    O2 Sat 92.3 92.0 - 98.5 %    O2Hb 90.1 (L) 94.0 - 97.0 %    COHb 2.1 (H) 0.0 - 1.5 %    MetHb 0.3 0.0 - 1.5 %    O2 Content 19.4 mL/dL    HHb 7.5 (H) 0.0 - 5.0 %    tHb (est) 15.3 11.5 - 16.5 g/dL    Mode NRB 15L     Date Of Collection      Time Collected      Pt Temp 37.0 C     ID [id number]     Lab [id number]     Critical(s) Notified . No Critical Values    Procalcitonin   Result Value Ref Range    Procalcitonin 2.26 (H) 0.00 - 0.08 ng/mL   Magnesium   Result Value Ref Range    Magnesium 1.4 (L) 1.6 - 2.6 mg/dL   Lactic Acid   Result Value Ref Range    Lactic Acid 1.9 0.5 - 2.2 mmol/L   URINE ELECTROLYTES   Result Value Ref Range    Sodium, Ur 23 Not Established mmol/L    Potassium, Ur 62.4 Not Established mmol/L    Chloride <20 Not Established mmol/L   OSMOLALITY, URINE   Result Value Ref Range    Osmolality, Ur 289 (L) 300 - 900 mOsm/kg   OSMOLALITY, SERUM   Result Value Ref Range    Osmolality 254 (L) 285 - 310 mOsm/Kg   Troponin   Result Value Ref Range    Troponin, High Sensitivity 16 (H) 0 - 11 ng/L   Basic Metabolic Panel w/ Reflex to MG   Result Value Ref Range    Sodium 119 (LL) 132 - 146 mmol/L    Potassium reflex Magnesium 3.2 (L) 3.5 - 5.0 mmol/L    Chloride 81 (L) 98 - 107 mmol/L    CO2 22 22 - 29 mmol/L    Anion Gap 16 7 - 16 mmol/L    Glucose 222 (H) 74 - 99 mg/dL    BUN 11 6 - 20 mg/dL    Creatinine 1.0 0.7 - 1.2 mg/dL    Est, Glom Filt Rate >60 >=60 mL/min/1.73    Calcium 8.2 (L) 8.6 - 10.2 mg/dL   ,       RADIOLOGY:      Radiologist interpretation  CT HEAD WO CONTRAST   Final Result   No acute intracranial abnormality.          CTA PULMONARY W CONTRAST   Final Result 1.  There is no pulmonary embolus      2. Multifocal bilateral ground-glass pneumonia more prominent within the   left upper lobe and left lower lobe. 3.  5 mm pleural based nodule seen within the left lower lobe on axial image   number 124         CT ABDOMEN PELVIS W IV CONTRAST Additional Contrast? None   Final Result   No evidence of acute abdominal/pelvic pathology is seen. Airspace opacification visualized in the lung fields would recommend clinical   correlation for COVID-19 disease or pneumonia.                 ------------------------- NURSING NOTES AND VITALS REVIEWED ---------------------------   The nursing notes within the ED encounter and vital signs as below have been reviewed by myself  /83   Pulse 90   Temp 99 °F (37.2 °C)   Resp 22   Ht 6' (1.829 m)   Wt 215 lb (97.5 kg)   SpO2 98%   BMI 29.16 kg/m²     Oxygen Saturation Interpretation: Abnormal    The patients available past medical records and past encounters were reviewed.         ------------------------------ ED COURSE/MEDICAL DECISION MAKING----------------------  Medications   sodium chloride flush 0.9 % injection 5-40 mL (has no administration in time range)   sodium chloride flush 0.9 % injection 5-40 mL (has no administration in time range)   0.9 % sodium chloride infusion (has no administration in time range)   multivitamin 1 tablet (1 tablet Oral Given 11/26/22 1062)   LORazepam (ATIVAN) tablet 1 mg (has no administration in time range)     Or   LORazepam (ATIVAN) injection 1 mg (has no administration in time range)     Or   LORazepam (ATIVAN) tablet 2 mg (has no administration in time range)     Or   LORazepam (ATIVAN) injection 2 mg (has no administration in time range)     Or   LORazepam (ATIVAN) tablet 3 mg (has no administration in time range)     Or   LORazepam (ATIVAN) injection 3 mg (has no administration in time range)     Or   LORazepam (ATIVAN) tablet 4 mg (has no administration in time range)     Or LORazepam (ATIVAN) injection 4 mg (has no administration in time range)   dextrose 5 % and 0.9 % sodium chloride infusion ( IntraVENous New Bag 11/26/22 1708)   sodium chloride flush 0.9 % injection 5-40 mL (has no administration in time range)   sodium chloride flush 0.9 % injection 5-40 mL (has no administration in time range)   0.9 % sodium chloride infusion (has no administration in time range)   sodium chloride flush 0.9 % injection 5-40 mL (has no administration in time range)   sodium chloride flush 0.9 % injection 5-40 mL (has no administration in time range)   0.9 % sodium chloride infusion (has no administration in time range)   enoxaparin (LOVENOX) injection 40 mg (has no administration in time range)   ondansetron (ZOFRAN-ODT) disintegrating tablet 4 mg (has no administration in time range)     Or   ondansetron (ZOFRAN) injection 4 mg (has no administration in time range)   polyethylene glycol (GLYCOLAX) packet 17 g (has no administration in time range)   acetaminophen (TYLENOL) tablet 650 mg (has no administration in time range)     Or   acetaminophen (TYLENOL) suppository 650 mg (has no administration in time range)   iopamidol (ISOVUE-370) 76 % injection 90 mL (90 mLs IntraVENous Given 11/26/22 1455)   magnesium sulfate 2000 mg in 50 mL IVPB premix (0 mg IntraVENous Stopped 11/26/22 1748)   dexamethasone (DECADRON) injection 10 mg (10 mg IntraVENous Given 11/26/22 1647)   cefTRIAXone (ROCEPHIN) 2,000 mg in sterile water 20 mL IV syringe (2,000 mg IntraVENous Given 11/26/22 1653)   doxycycline (VIBRAMYCIN) 100 mg in dextrose 5 % 100 mL IVPB ([id number]) (0 mg IntraVENous Stopped 11/26/22 1810)   ketorolac (TORADOL) injection 15 mg (15 mg IntraVENous Given 11/26/22 1648)   potassium chloride 10 mEq/100 mL IVPB (Peripheral Line) (0 mEq IntraVENous Stopped 11/26/22 1821)   potassium chloride (KLOR-CON M) extended release tablet 20 mEq (20 mEq Oral Given 11/26/22 1645)   thiamine (B-1) injection 500 mg (500 mg IntraVENous Given 60/26/32 2555)   folic acid (FOLVITE) tablet 1 mg (1 mg Oral Given 11/26/22 1826)   PHENobarbital (LUMINAL) 130 mg in sodium chloride 0.9 % 100 mL IVPB (0 mg IntraVENous Stopped 11/26/22 1810)   trimethobenzamide (TIGAN) injection 200 mg (200 mg IntraMUSCular Given 11/26/22 1740)           The cardiac monitor revealed NSR with a heart rate in the 90s as interpreted by me. The cardiac monitor was ordered secondary to the patient's syncope and to monitor the patient for dysrhythmia. CPT [id number]       I, Dr. Chau Zavala, am the primary provider of record    Medical Decision Making:   The patient is a 78-year-old male presents emergency department complaining of syncope. The patient was actually hypoxic on arrival only saturating around 84% on room air. He does not wear any oxygen at home. The patient was placed on nonrebreather and improved to 94%. He has had multiple single episodes I did immediately take to CT scan. There was no evidence of intracranial hemorrhage and no evidence of PE. There is evidence of groundglass opacifications in the bilateral lungs which was concerning for COVID. He did have a COVID test done here which was negative. Did send respiratory panel which is pending. We will treat for community acquired pneumonia at this time. Cultures were obtained prior to initiation of antibiotics. Patient was found to be hyponatremic with a sodium level 123 and had a lactic acidosis initially. He was treated with D5 normal saline per recommendations of nephrology who recommends admission to the ICU. Consulted with intensivist accepted for admission. Consulted with hospitalist who accepted for ICU admission as well. Patient was placed on CIWA. He was given a dose of phenobarbital and electrolytes were appropriately repleted and he was also given thiamine and folic acid as well.   Repeat labs pending and did discuss that the nephrologist would like call back with repeat BMP 4 hours after initiation of fluids. Patient admitted to ICU in serious condition. While not exhaustive, the following diagnoses were considered: hyponatremia vs pe vs covid vs pneumonia vs electrolyte abnormality vs dt vs alcohol withdrawal      Oxygen Saturation Interpretation: 98 % on room air. Re-Evaluations:  ED Course as of 11/26/22 2101   Sat Nov 26, 2022   1700 Consulted with hospitalist, who accepted for admission. [KG]   1906 EKG: This EKG is signed and interpreted by me. Rate: 93  Rhythm: Sinus  Interpretation: Sinus rhythm with first-degree AV block, left axis deviation, left anterior fascicular block, right bundle branch block, prolonged QT, QTC is 514  Comparison: stable as compared to patient's most recent EKG    [KG]   1907 EKG: This EKG is signed and interpreted by me. Rate: 97  Rhythm: Sinus  Interpretation: Normal sinus rhythm, left axis deviation, left atrial enlargement, right bundle branch block, left ventricular hypertrophy, prolonged QT, QTC is 513  Comparison: no previous EKG available    [KG]      ED Course User Index  [KG] Adrianne Serna,              This patient's ED course included: a personal history and physicial examination, re-evaluation prior to disposition, multiple bedside re-evaluations, IV medications, cardiac monitoring, continuous pulse oximetry, and complex medical decision making and emergency management    This patient has remained hemodynamically stable during their ED course. Consultations:  Spoke with Dr. Anna Cantu (nephrology). Discussed case. They will provide consultation. Spoke with Dr. Ronald Hunter (ICU). Discussed case. They will admit this patient and will provide consultation. Spoke with Dr. Curtis Quinn (Medicine). Discussed case. They will admit this patient. Counseling:    The emergency provider has spoken with the patient and discussed todays results, in addition to providing specific details for the plan of care and counseling regarding the diagnosis and prognosis. Questions are answered at this time and they are agreeable with the plan.       --------------------------------- IMPRESSION AND DISPOSITION ---------------------------------    IMPRESSION  1. Hyponatremia    2. Acute respiratory failure with hypoxia (HCC)    3. Transaminitis    4. Hyperbilirubinemia    5. Lactic acidosis    6. Hypokalemia    7. Hypomagnesemia    8. Pneumonia due to infectious organism, unspecified laterality, unspecified part of lung    9. Pulmonary nodule        DISPOSITION  Disposition: Admit to CCU/ICU  Patient condition is critical        NOTE: This report was transcribed using voice recognition software. Every effort was made to ensure accuracy; however, inadvertent computerized transcription errors may be present     Please note that the withdrawal or failure to initiate urgent interventions for this patient would likely result in a life threatening deterioration or permanent disability. Accordingly this patient received 35 minutes of critical care time, excluding separately billable procedures.        Corbin Doll DO  11/26/22 0703

## 2022-11-26 NOTE — ED NOTES
Radiology Procedure Waiver   Name: Clayton Merlin  : 1965  MRN: [id number]    Date:  22    Time: 2:33 PM EST    Benefits of immediately proceeding with radiology exam(s) without pre-testing outweigh the risks or are not indicated as specified below and therefore the following is/are being waived:    [x] Benefits of immediate radiology exam(s) outweigh any risk. OR    Pre-exam testing is not indicated for the following reason(s):  [] Pregnancy test   [] Patients LMP on-time and regular.   [] Patient had Tubal Ligation or has other Contraception Device. [] Patient  is Menopausal or Premenarcheal.    [] Patient had Full or Partial Hysterectomy. [] Protocol for CT contrast allegry   [] Patient has tolerated well previously   [] Patient does not have a true allergy    [] MRI Questionnaire     [] BUN/Creatinine   [] Patient age w/no hx of renal dysfunction. [] Patient on Dialysis. [] Recent Normal Labs.   Electronically signed by Foster Sow DO on 22 at 2:33 PM EST               Foster Sow DO  22 [id number]

## 2022-11-26 NOTE — ED NOTES
SP02 83% on 6 L NC, patient placed on NRB 15L. Dr Ruby Vernon aware.       Jasson Coleman, RN  11/26/22 7404

## 2022-11-26 NOTE — ED NOTES
This RN removed NRB and placed patient on 6 L NC, SP02 dropped to 85%, placed back on NRB. Dr Harinder Cruz notified and informed me to keep patient on NRB.       Stephania Braga RN  11/26/22 3455

## 2022-11-27 ENCOUNTER — APPOINTMENT (OUTPATIENT)
Dept: GENERAL RADIOLOGY | Age: 57
DRG: 871 | End: 2022-11-27

## 2022-11-27 LAB
ACETAMINOPHEN LEVEL: <5 MCG/ML (ref 10–30)
AMPHETAMINE SCREEN, URINE: NOT DETECTED
ANION GAP SERPL CALCULATED.3IONS-SCNC: 10 MMOL/L (ref 7–16)
ANION GAP SERPL CALCULATED.3IONS-SCNC: 11 MMOL/L (ref 7–16)
ANION GAP SERPL CALCULATED.3IONS-SCNC: 13 MMOL/L (ref 7–16)
ANION GAP SERPL CALCULATED.3IONS-SCNC: 14 MMOL/L (ref 7–16)
APTT: 38 SEC (ref 24.5–35.1)
BARBITURATE SCREEN URINE: POSITIVE
BASOPHILS ABSOLUTE: 0 E9/L (ref 0–0.2)
BASOPHILS ABSOLUTE: 0 E9/L (ref 0–0.2)
BASOPHILS RELATIVE PERCENT: 0.1 % (ref 0–2)
BASOPHILS RELATIVE PERCENT: 0.1 % (ref 0–2)
BENZODIAZEPINE SCREEN, URINE: NOT DETECTED
BUN BLDV-MCNC: 12 MG/DL (ref 6–20)
BUN BLDV-MCNC: 12 MG/DL (ref 6–20)
BUN BLDV-MCNC: 13 MG/DL (ref 6–20)
BUN BLDV-MCNC: 13 MG/DL (ref 6–20)
BUN BLDV-MCNC: 14 MG/DL (ref 6–20)
BUN BLDV-MCNC: 16 MG/DL (ref 6–20)
CALCIUM IONIZED: 1.18 MMOL/L (ref 1.15–1.33)
CALCIUM SERPL-MCNC: 8.4 MG/DL (ref 8.6–10.2)
CALCIUM SERPL-MCNC: 8.7 MG/DL (ref 8.6–10.2)
CALCIUM SERPL-MCNC: 8.8 MG/DL (ref 8.6–10.2)
CALCIUM SERPL-MCNC: 8.8 MG/DL (ref 8.6–10.2)
CALCIUM SERPL-MCNC: 8.9 MG/DL (ref 8.6–10.2)
CALCIUM SERPL-MCNC: 9 MG/DL (ref 8.6–10.2)
CANNABINOID SCREEN URINE: NOT DETECTED
CHLORIDE BLD-SCNC: 81 MMOL/L (ref 98–107)
CHLORIDE BLD-SCNC: 84 MMOL/L (ref 98–107)
CHLORIDE BLD-SCNC: 85 MMOL/L (ref 98–107)
CHLORIDE BLD-SCNC: 87 MMOL/L (ref 98–107)
CHLORIDE BLD-SCNC: 88 MMOL/L (ref 98–107)
CHLORIDE BLD-SCNC: 89 MMOL/L (ref 98–107)
CHLORIDE URINE RANDOM: <20 MMOL/L
CO2: 26 MMOL/L (ref 22–29)
CO2: 26 MMOL/L (ref 22–29)
CO2: 27 MMOL/L (ref 22–29)
CO2: 29 MMOL/L (ref 22–29)
CO2: 29 MMOL/L (ref 22–29)
CO2: 30 MMOL/L (ref 22–29)
COCAINE METABOLITE SCREEN URINE: NOT DETECTED
CORTISOL TOTAL: 19.54 MCG/DL (ref 2.68–18.4)
CREAT SERPL-MCNC: 0.9 MG/DL (ref 0.7–1.2)
CREAT SERPL-MCNC: 1 MG/DL (ref 0.7–1.2)
CREAT SERPL-MCNC: 1 MG/DL (ref 0.7–1.2)
CREAT SERPL-MCNC: 1.1 MG/DL (ref 0.7–1.2)
CREATININE URINE: 134 MG/DL (ref 40–278)
EOSINOPHILS ABSOLUTE: 0 E9/L (ref 0.05–0.5)
EOSINOPHILS ABSOLUTE: 0 E9/L (ref 0.05–0.5)
EOSINOPHILS RELATIVE PERCENT: 0 % (ref 0–6)
EOSINOPHILS RELATIVE PERCENT: 0 % (ref 0–6)
ETHANOL: <10 MG/DL (ref 0–0.08)
FENTANYL SCREEN, URINE: NOT DETECTED
FIBRINOGEN: 526 MG/DL (ref 200–400)
GFR SERPL CREATININE-BSD FRML MDRD: >60 ML/MIN/1.73
GLUCOSE BLD-MCNC: 143 MG/DL (ref 74–99)
GLUCOSE BLD-MCNC: 143 MG/DL (ref 74–99)
GLUCOSE BLD-MCNC: 154 MG/DL (ref 74–99)
GLUCOSE BLD-MCNC: 155 MG/DL (ref 74–99)
GLUCOSE BLD-MCNC: 159 MG/DL (ref 74–99)
GLUCOSE BLD-MCNC: 220 MG/DL (ref 74–99)
HAPTOGLOBIN: 159 MG/DL (ref 30–200)
HBA1C MFR BLD: 5.8 % (ref 4–5.6)
HCT VFR BLD CALC: 33.3 % (ref 37–54)
HCT VFR BLD CALC: 36.2 % (ref 37–54)
HEMOGLOBIN: 12.2 G/DL (ref 12.5–16.5)
HEMOGLOBIN: 13.4 G/DL (ref 12.5–16.5)
INR BLD: 1.4
L. PNEUMOPHILA SEROGP 1 UR AG: NORMAL
LIPASE: 57 U/L (ref 13–60)
LYMPHOCYTES ABSOLUTE: 0.09 E9/L (ref 1.5–4)
LYMPHOCYTES ABSOLUTE: 0.1 E9/L (ref 1.5–4)
LYMPHOCYTES RELATIVE PERCENT: 0.8 % (ref 20–42)
LYMPHOCYTES RELATIVE PERCENT: 0.9 % (ref 20–42)
Lab: ABNORMAL
MAGNESIUM: 2 MG/DL (ref 1.6–2.6)
MCH RBC QN AUTO: 31.9 PG (ref 26–35)
MCH RBC QN AUTO: 32.1 PG (ref 26–35)
MCHC RBC AUTO-ENTMCNC: 36.6 % (ref 32–34.5)
MCHC RBC AUTO-ENTMCNC: 37 % (ref 32–34.5)
MCV RBC AUTO: 86.8 FL (ref 80–99.9)
MCV RBC AUTO: 86.9 FL (ref 80–99.9)
METER GLUCOSE: 145 MG/DL (ref 74–99)
METER GLUCOSE: 150 MG/DL (ref 74–99)
METER GLUCOSE: 161 MG/DL (ref 74–99)
METHADONE SCREEN, URINE: NOT DETECTED
MONOCYTES ABSOLUTE: 0.09 E9/L (ref 0.1–0.95)
MONOCYTES ABSOLUTE: 0.48 E9/L (ref 0.1–0.95)
MONOCYTES RELATIVE PERCENT: 0.9 % (ref 2–12)
MONOCYTES RELATIVE PERCENT: 5.2 % (ref 2–12)
NEUTROPHILS ABSOLUTE: 9.11 E9/L (ref 1.8–7.3)
NEUTROPHILS ABSOLUTE: 9.12 E9/L (ref 1.8–7.3)
NEUTROPHILS RELATIVE PERCENT: 94 % (ref 43–80)
NEUTROPHILS RELATIVE PERCENT: 98.2 % (ref 43–80)
OPIATE SCREEN URINE: NOT DETECTED
OSMOLALITY URINE: 288 MOSM/KG (ref 300–900)
OSMOLALITY: 265 MOSM/KG (ref 285–310)
OVALOCYTES: ABNORMAL
OXYCODONE URINE: NOT DETECTED
PDW BLD-RTO: 12.1 FL (ref 11.5–15)
PDW BLD-RTO: 12.2 FL (ref 11.5–15)
PHENCYCLIDINE SCREEN URINE: NOT DETECTED
PHOSPHORUS: 3.1 MG/DL (ref 2.5–4.5)
PLATELET # BLD: 107 E9/L (ref 130–450)
PLATELET # BLD: 97 E9/L (ref 130–450)
PLATELET CONFIRMATION: NORMAL
PMV BLD AUTO: 10 FL (ref 7–12)
PMV BLD AUTO: 10 FL (ref 7–12)
POIKILOCYTES: ABNORMAL
POLYCHROMASIA: ABNORMAL
POTASSIUM SERPL-SCNC: 3.3 MMOL/L (ref 3.5–5)
POTASSIUM SERPL-SCNC: 3.4 MMOL/L (ref 3.5–5)
POTASSIUM SERPL-SCNC: 3.6 MMOL/L (ref 3.5–5)
POTASSIUM SERPL-SCNC: 3.6 MMOL/L (ref 3.5–5)
POTASSIUM SERPL-SCNC: 3.7 MMOL/L (ref 3.5–5)
POTASSIUM SERPL-SCNC: 3.9 MMOL/L (ref 3.5–5)
POTASSIUM, UR: 60.7 MMOL/L
PROTHROMBIN TIME: 15.5 SEC (ref 9.3–12.4)
RBC # BLD: 3.83 E12/L (ref 3.8–5.8)
RBC # BLD: 4.17 E12/L (ref 3.8–5.8)
SALICYLATE, SERUM: <0.3 MG/DL (ref 0–30)
SODIUM BLD-SCNC: 121 MMOL/L (ref 132–146)
SODIUM BLD-SCNC: 124 MMOL/L (ref 132–146)
SODIUM BLD-SCNC: 125 MMOL/L (ref 132–146)
SODIUM BLD-SCNC: 125 MMOL/L (ref 132–146)
SODIUM BLD-SCNC: 127 MMOL/L (ref 132–146)
SODIUM BLD-SCNC: 129 MMOL/L (ref 132–146)
SODIUM URINE: 26 MMOL/L
STREP PNEUMONIAE ANTIGEN, URINE: NORMAL
TRICYCLIC ANTIDEPRESSANTS SCREEN SERUM: NEGATIVE NG/ML
TSH SERPL DL<=0.05 MIU/L-ACNC: 0.58 UIU/ML (ref 0.27–4.2)
UREA NITROGEN, UR: 229 MG/DL (ref 800–1666)
WBC # BLD: 9.3 E9/L (ref 4.5–11.5)
WBC # BLD: 9.7 E9/L (ref 4.5–11.5)

## 2022-11-27 PROCEDURE — 80307 DRUG TEST PRSMV CHEM ANLYZR: CPT

## 2022-11-27 PROCEDURE — 85610 PROTHROMBIN TIME: CPT

## 2022-11-27 PROCEDURE — 6360000002 HC RX W HCPCS: Performed by: INTERNAL MEDICINE

## 2022-11-27 PROCEDURE — 71045 X-RAY EXAM CHEST 1 VIEW: CPT

## 2022-11-27 PROCEDURE — 80143 DRUG ASSAY ACETAMINOPHEN: CPT

## 2022-11-27 PROCEDURE — 82077 ASSAY SPEC XCP UR&BREATH IA: CPT

## 2022-11-27 PROCEDURE — 84100 ASSAY OF PHOSPHORUS: CPT

## 2022-11-27 PROCEDURE — 85384 FIBRINOGEN ACTIVITY: CPT

## 2022-11-27 PROCEDURE — 87449 NOS EACH ORGANISM AG IA: CPT

## 2022-11-27 PROCEDURE — 93005 ELECTROCARDIOGRAM TRACING: CPT | Performed by: INTERNAL MEDICINE

## 2022-11-27 PROCEDURE — 6370000000 HC RX 637 (ALT 250 FOR IP)

## 2022-11-27 PROCEDURE — 85730 THROMBOPLASTIN TIME PARTIAL: CPT

## 2022-11-27 PROCEDURE — 84300 ASSAY OF URINE SODIUM: CPT

## 2022-11-27 PROCEDURE — 84443 ASSAY THYROID STIM HORMONE: CPT

## 2022-11-27 PROCEDURE — 83036 HEMOGLOBIN GLYCOSYLATED A1C: CPT

## 2022-11-27 PROCEDURE — 82962 GLUCOSE BLOOD TEST: CPT

## 2022-11-27 PROCEDURE — 82570 ASSAY OF URINE CREATININE: CPT

## 2022-11-27 PROCEDURE — 2000000000 HC ICU R&B

## 2022-11-27 PROCEDURE — 2580000003 HC RX 258: Performed by: INTERNAL MEDICINE

## 2022-11-27 PROCEDURE — 82330 ASSAY OF CALCIUM: CPT

## 2022-11-27 PROCEDURE — 83010 ASSAY OF HAPTOGLOBIN QUANT: CPT

## 2022-11-27 PROCEDURE — 80048 BASIC METABOLIC PNL TOTAL CA: CPT

## 2022-11-27 PROCEDURE — 84540 ASSAY OF URINE/UREA-N: CPT

## 2022-11-27 PROCEDURE — 83930 ASSAY OF BLOOD OSMOLALITY: CPT

## 2022-11-27 PROCEDURE — 6360000002 HC RX W HCPCS

## 2022-11-27 PROCEDURE — 83735 ASSAY OF MAGNESIUM: CPT

## 2022-11-27 PROCEDURE — 82436 ASSAY OF URINE CHLORIDE: CPT

## 2022-11-27 PROCEDURE — 6360000002 HC RX W HCPCS: Performed by: EMERGENCY MEDICINE

## 2022-11-27 PROCEDURE — 84133 ASSAY OF URINE POTASSIUM: CPT

## 2022-11-27 PROCEDURE — 85025 COMPLETE CBC W/AUTO DIFF WBC: CPT

## 2022-11-27 PROCEDURE — 83935 ASSAY OF URINE OSMOLALITY: CPT

## 2022-11-27 PROCEDURE — 6370000000 HC RX 637 (ALT 250 FOR IP): Performed by: STUDENT IN AN ORGANIZED HEALTH CARE EDUCATION/TRAINING PROGRAM

## 2022-11-27 PROCEDURE — 2580000003 HC RX 258: Performed by: LICENSED PRACTICAL NURSE

## 2022-11-27 PROCEDURE — 36415 COLL VENOUS BLD VENIPUNCTURE: CPT

## 2022-11-27 PROCEDURE — 2580000003 HC RX 258

## 2022-11-27 PROCEDURE — 93306 TTE W/DOPPLER COMPLETE: CPT

## 2022-11-27 PROCEDURE — 80179 DRUG ASSAY SALICYLATE: CPT

## 2022-11-27 PROCEDURE — 87081 CULTURE SCREEN ONLY: CPT

## 2022-11-27 PROCEDURE — 2700000000 HC OXYGEN THERAPY PER DAY

## 2022-11-27 PROCEDURE — 82533 TOTAL CORTISOL: CPT

## 2022-11-27 PROCEDURE — 83690 ASSAY OF LIPASE: CPT

## 2022-11-27 RX ORDER — CHLORHEXIDINE GLUCONATE 0.12 MG/ML
15 RINSE ORAL 2 TIMES DAILY
Status: DISCONTINUED | OUTPATIENT
Start: 2022-11-27 | End: 2022-11-28

## 2022-11-27 RX ORDER — FOLIC ACID 1 MG/1
1 TABLET ORAL DAILY
Status: DISCONTINUED | OUTPATIENT
Start: 2022-11-27 | End: 2022-11-30 | Stop reason: HOSPADM

## 2022-11-27 RX ORDER — SODIUM CHLORIDE 0.9 % (FLUSH) 0.9 %
5-40 SYRINGE (ML) INJECTION EVERY 12 HOURS SCHEDULED
Status: DISCONTINUED | OUTPATIENT
Start: 2022-11-27 | End: 2022-11-30 | Stop reason: HOSPADM

## 2022-11-27 RX ORDER — LANOLIN ALCOHOL/MO/W.PET/CERES
100 CREAM (GRAM) TOPICAL DAILY
Status: DISCONTINUED | OUTPATIENT
Start: 2022-12-04 | End: 2022-11-30 | Stop reason: HOSPADM

## 2022-11-27 RX ORDER — DEXTROSE MONOHYDRATE 50 MG/ML
INJECTION, SOLUTION INTRAVENOUS CONTINUOUS
Status: ACTIVE | OUTPATIENT
Start: 2022-11-27 | End: 2022-11-27

## 2022-11-27 RX ORDER — LANOLIN ALCOHOL/MO/W.PET/CERES
100 CREAM (GRAM) TOPICAL DAILY
Status: DISCONTINUED | OUTPATIENT
Start: 2022-11-27 | End: 2022-11-27

## 2022-11-27 RX ORDER — DEXTROSE MONOHYDRATE 50 MG/ML
INJECTION, SOLUTION INTRAVENOUS CONTINUOUS
Status: DISCONTINUED | OUTPATIENT
Start: 2022-11-27 | End: 2022-11-28

## 2022-11-27 RX ORDER — SODIUM CHLORIDE 9 MG/ML
INJECTION, SOLUTION INTRAVENOUS PRN
Status: DISCONTINUED | OUTPATIENT
Start: 2022-11-27 | End: 2022-11-30 | Stop reason: HOSPADM

## 2022-11-27 RX ORDER — SODIUM CHLORIDE 0.9 % (FLUSH) 0.9 %
5-40 SYRINGE (ML) INJECTION PRN
Status: DISCONTINUED | OUTPATIENT
Start: 2022-11-27 | End: 2022-11-28

## 2022-11-27 RX ADMIN — AMPICILLIN SODIUM AND SULBACTAM SODIUM 3000 MG: 2; 1 INJECTION, POWDER, FOR SOLUTION INTRAMUSCULAR; INTRAVENOUS at 18:55

## 2022-11-27 RX ADMIN — Medication 1000 UNITS: at 09:19

## 2022-11-27 RX ADMIN — AMPICILLIN SODIUM AND SULBACTAM SODIUM 3000 MG: 2; 1 INJECTION, POWDER, FOR SOLUTION INTRAMUSCULAR; INTRAVENOUS at 12:58

## 2022-11-27 RX ADMIN — Medication 100 MG: at 09:39

## 2022-11-27 RX ADMIN — AMLODIPINE BESYLATE 10 MG: 10 TABLET ORAL at 09:19

## 2022-11-27 RX ADMIN — DEXTROSE MONOHYDRATE: 50 INJECTION, SOLUTION INTRAVENOUS at 20:46

## 2022-11-27 RX ADMIN — ENOXAPARIN SODIUM 40 MG: 100 INJECTION SUBCUTANEOUS at 09:21

## 2022-11-27 RX ADMIN — THIAMINE HYDROCHLORIDE 500 MG: 100 INJECTION, SOLUTION INTRAMUSCULAR; INTRAVENOUS at 20:50

## 2022-11-27 RX ADMIN — METOPROLOL SUCCINATE 50 MG: 50 TABLET, EXTENDED RELEASE ORAL at 09:19

## 2022-11-27 RX ADMIN — AMPICILLIN SODIUM AND SULBACTAM SODIUM 3000 MG: 2; 1 INJECTION, POWDER, FOR SOLUTION INTRAMUSCULAR; INTRAVENOUS at 07:03

## 2022-11-27 RX ADMIN — SODIUM CHLORIDE, PRESERVATIVE FREE 10 ML: 5 INJECTION INTRAVENOUS at 21:27

## 2022-11-27 RX ADMIN — FOLIC ACID 1 MG: 1 TABLET ORAL at 09:19

## 2022-11-27 RX ADMIN — MULTIVITAMIN TABLET 1 TABLET: TABLET at 09:19

## 2022-11-27 RX ADMIN — AMPICILLIN SODIUM AND SULBACTAM SODIUM 3000 MG: 2; 1 INJECTION, POWDER, FOR SOLUTION INTRAMUSCULAR; INTRAVENOUS at 01:32

## 2022-11-27 RX ADMIN — THIAMINE HYDROCHLORIDE 500 MG: 100 INJECTION, SOLUTION INTRAMUSCULAR; INTRAVENOUS at 12:04

## 2022-11-27 RX ADMIN — ATORVASTATIN CALCIUM 40 MG: 40 TABLET, FILM COATED ORAL at 09:19

## 2022-11-27 RX ADMIN — DEXTROSE MONOHYDRATE: 50 INJECTION, SOLUTION INTRAVENOUS at 11:06

## 2022-11-27 ASSESSMENT — PAIN SCALES - GENERAL
PAINLEVEL_OUTOF10: 0
PAINLEVEL_OUTOF10: 0

## 2022-11-27 NOTE — PLAN OF CARE
Problem: Discharge Planning  Goal: Discharge to home or other facility with appropriate resources  Outcome: Progressing  Flowsheets (Taken 11/27/2022 1718)  Discharge to home or other facility with appropriate resources:   Identify barriers to discharge with patient and caregiver   Arrange for needed discharge resources and transportation as appropriate     Problem: Safety - Adult  Goal: Free from fall injury  Outcome: Progressing     Problem: Neurosensory - Adult  Goal: Absence of seizures  Outcome: Progressing  Goal: Remains free of injury related to seizures activity  Outcome: Progressing     Problem: Respiratory - Adult  Goal: Achieves optimal ventilation and oxygenation  Outcome: Progressing     Problem: Cardiovascular - Adult  Goal: Absence of cardiac dysrhythmias or at baseline  Outcome: Progressing     Problem: Skin/Tissue Integrity - Adult  Goal: Skin integrity remains intact  Outcome: Progressing  Goal: Oral mucous membranes remain intact  Outcome: Progressing     Problem: Gastrointestinal - Adult  Goal: Minimal or absence of nausea and vomiting  Outcome: Progressing  Goal: Maintains adequate nutritional intake  Outcome: Progressing     Problem: Genitourinary - Adult  Goal: Absence of urinary retention  Outcome: Progressing     Problem: Safety - Adult  Goal: Free from fall injury  Outcome: Progressing

## 2022-11-27 NOTE — CONSULTS
Department of Internal Medicine  Nephrology Nurse Practitioner Consult Note      Reason for Consult: Hyponatremia  Requesting Physician: Dr. Deleon Friendly: Syncope and fall    History Obtained From:  patient, electronic medical record    HISTORY OF PRESENT ILLNESS:  Briefly, Antoine Pina is a 57-year-old male, past medical history significant for HTN, DM type II, EtOH abuse and tobacco abuse (chewing) who presented to the ED on 11/26/22 complaints of syncopal episodes s/p fall. Initial lab work significant for sodium level 123, reason for this consultation. Patient reports two recent episodes of vomiting with minimal output, denies frequent urination, denies diarrhea, endorses high fluid intake. Medications include hydrochlorothiazide, metformin, lisinopril, denies use of naproxen.     Past Medical History:        Diagnosis Date    Diabetes ([facility] [address]     Hypertension      Past Surgical History:        Procedure Laterality Date    ECHO COMPL W DOP COLOR FLOW  1/17/2012         FEMUR FRACTURE SURGERY      TONSILLECTOMY       Current Medications:    Current Facility-Administered Medications: folic acid (FOLVITE) tablet 1 mg, 1 mg, Oral, Daily  thiamine tablet 100 mg, 100 mg, Oral, Daily  sodium chloride flush 0.9 % injection 5-40 mL, 5-40 mL, IntraVENous, 2 times per day  sodium chloride flush 0.9 % injection 5-40 mL, 5-40 mL, IntraVENous, PRN  multivitamin 1 tablet, 1 tablet, Oral, Daily  LORazepam (ATIVAN) tablet 1 mg, 1 mg, Oral, Q1H PRN **OR** LORazepam (ATIVAN) injection 1 mg, 1 mg, IntraVENous, Q1H PRN **OR** LORazepam (ATIVAN) tablet 2 mg, 2 mg, Oral, Q1H PRN **OR** LORazepam (ATIVAN) injection 2 mg, 2 mg, IntraVENous, Q1H PRN **OR** LORazepam (ATIVAN) tablet 3 mg, 3 mg, Oral, Q1H PRN **OR** LORazepam (ATIVAN) injection 3 mg, 3 mg, IntraVENous, Q1H PRN **OR** LORazepam (ATIVAN) tablet 4 mg, 4 mg, Oral, Q1H PRN **OR** LORazepam (ATIVAN) injection 4 mg, 4 mg, IntraVENous, Q1H PRN  sodium chloride flush 0.9 % injection 5-40 mL, 5-40 mL, IntraVENous, 2 times per day  sodium chloride flush 0.9 % injection 5-40 mL, 5-40 mL, IntraVENous, PRN  sodium chloride flush 0.9 % injection 5-40 mL, 5-40 mL, IntraVENous, 2 times per day  sodium chloride flush 0.9 % injection 5-40 mL, 5-40 mL, IntraVENous, PRN  enoxaparin (LOVENOX) injection 40 mg, 40 mg, SubCUTAneous, Daily  ampicillin-sulbactam (UNASYN) 3,000 mg in sodium chloride 0.9 % 100 mL IVPB ([id number]), 3,000 mg, IntraVENous, Q6H  sodium chloride flush 0.9 % injection 5-40 mL, 5-40 mL, IntraVENous, 2 times per day  sodium chloride flush 0.9 % injection 5-40 mL, 5-40 mL, IntraVENous, PRN  0.9 % sodium chloride infusion, , IntraVENous, PRN  ondansetron (ZOFRAN-ODT) disintegrating tablet 4 mg, 4 mg, Oral, Q8H PRN **OR** ondansetron (ZOFRAN) injection 4 mg, 4 mg, IntraVENous, Q6H PRN  polyethylene glycol (GLYCOLAX) packet 17 g, 17 g, Oral, Daily PRN  acetaminophen (TYLENOL) tablet 650 mg, 650 mg, Oral, Q6H PRN **OR** acetaminophen (TYLENOL) suppository 650 mg, 650 mg, Rectal, Q6H PRN  amLODIPine (NORVASC) tablet 10 mg, 10 mg, Oral, QAM  atorvastatin (LIPITOR) tablet 40 mg, 40 mg, Oral, QAM  Vitamin D (CHOLECALCIFEROL) tablet 1,000 Units, 1,000 Units, Oral, QAM  metoprolol succinate (TOPROL XL) extended release tablet 50 mg, 50 mg, Oral, QAM  insulin lispro (HUMALOG) injection vial 0-4 Units, 0-4 Units, SubCUTAneous, TID WC  insulin lispro (HUMALOG) injection vial 0-4 Units, 0-4 Units, SubCUTAneous, Nightly  glucose chewable tablet 16 g, 4 tablet, Oral, PRN  dextrose bolus 10% 125 mL, 125 mL, IntraVENous, PRN **OR** dextrose bolus 10% 250 mL, 250 mL, IntraVENous, PRN  glucagon (rDNA) injection 1 mg, 1 mg, SubCUTAneous, PRN  dextrose 10 % infusion, , IntraVENous, Continuous PRN  Allergies:  Patient has no known allergies. Social History:    TOBACCO:   reports that he has quit smoking.  He has never used smokeless tobacco.  ETOH:   reports current alcohol use.  DRUGS:   reports no history of drug use.     Family History:       Problem Relation Age of Onset    Heart Disease Father      REVIEW OF SYSTEMS:    Review of Systems - General ROS: negative for - chills, fatigue, fever, or night sweats  Psychological ROS: negative  ENT ROS: negative  Respiratory ROS: no cough, shortness of breath, or wheezing  Cardiovascular ROS: no chest pain or dyspnea on exertion  Gastrointestinal ROS: no abdominal pain, change in bowel habits, or black or bloody stools, positive for vomiting  Genito-Urinary ROS: no dysuria, trouble voiding, or hematuria  Musculoskeletal ROS: negative  Neurological ROS: no TIA or stroke symptoms, no headache, no syncope  PHYSICAL EXAM:      Vitals:    VITALS:  BP (!) 93/53   Pulse 69   Temp 98.9 °F (37.2 °C) (Oral)   Resp 14   Ht 6' (1.829 m)   Wt 202 lb 9.6 oz (91.9 kg)   SpO2 100%   BMI 27.48 kg/m²   24HR INTAKE/OUTPUT:    Intake/Output Summary (Last 24 hours) at 11/27/2022 0926  Last data filed at 11/27/2022 0716  Gross per 24 hour   Intake 1485.32 ml   Output 625 ml   Net 860.32 ml       Constitutional:  Alert, oriented, NAD  HEENT:  PERRLA, normocephalic  Respiratory:  CTA  Cardiovascular/Edema:  No edema, normal S1, S2, RRR  Gastrointestinal:  Soft, nondistended, nontender  Neurologic:  Nonfocal, oriented x 3  Skin:  No rashes, lesions, warm and dry      DATA:    CBC with Differential:    Lab Results   Component Value Date/Time    WBC 9.3 11/27/2022 04:25 AM    RBC 4.17 11/27/2022 04:25 AM    HGB 13.4 11/27/2022 04:25 AM    HCT 36.2 11/27/2022 04:25 AM    PLT 97 11/27/2022 04:25 AM    MCV 86.8 11/27/2022 04:25 AM    MCH 32.1 11/27/2022 04:25 AM    MCHC 37.0 11/27/2022 04:25 AM    RDW 12.1 11/27/2022 04:25 AM    LYMPHOPCT 0.9 11/27/2022 04:25 AM    MONOPCT 0.9 11/27/2022 04:25 AM    BASOPCT 0.1 11/27/2022 04:25 AM    MONOSABS 0.09 11/27/2022 04:25 AM    LYMPHSABS 0.09 11/27/2022 04:25 AM    EOSABS 0.00 11/27/2022 04:25 AM    BASOSABS 0.00 11/27/2022 04:25 AM     CMP:    Lab Results   Component Value Date/Time     11/27/2022 04:25 AM    K 3.6 11/27/2022 04:25 AM    K 3.2 11/26/2022 07:48 PM    CL 84 11/27/2022 04:25 AM    CO2 29 11/27/2022 04:25 AM    BUN 12 11/27/2022 04:25 AM    CREATININE 1.0 11/27/2022 04:25 AM    GFRAA >60 10/14/2014 06:15 PM    LABGLOM >60 11/27/2022 04:25 AM    GLUCOSE 143 11/27/2022 04:25 AM    GLUCOSE 91 01/17/2012 01:55 AM    PROT 8.7 11/26/2022 02:46 PM    LABALBU 4.0 11/26/2022 02:46 PM    LABALBU 3.6 01/17/2012 01:55 AM    CALCIUM 9.0 11/27/2022 04:25 AM    BILITOT 1.6 11/26/2022 02:46 PM    ALKPHOS 138 11/26/2022 02:46 PM    AST 83 11/26/2022 02:46 PM    ALT 77 11/26/2022 02:46 PM     Magnesium:    Lab Results   Component Value Date/Time    MG 1.9 11/26/2022 07:48 PM     Phosphorus:    Lab Results   Component Value Date/Time    PHOS 2.0 01/16/2012 06:43 PM     Radiology Review:    CXR 11/27/22   No acute process. CT Abd/pelvis 11/26/22   No evidence of acute abdominal/pelvic pathology is seen. Airspace opacification visualized in the lung fields would recommend clinical   correlation for COVID-19 disease or pneumonia. CTA chest 11/26/22   1. There is no pulmonary embolus       2. Multifocal bilateral ground-glass pneumonia more prominent within the   left upper lobe and left lower lobe. 3.  5 mm pleural based nodule seen within the left lower lobe on axial image   number 124         IMPRESSION/RECOMMENDATIONS:      Nader Mahoney is a 68-year-old male, past medical history significant for HTN, DM type II, EtOH abuse and tobacco abuse (chewing) who presented to the ED on 11/26/22 complaints of syncopal episodes s/p fall. Initial lab work significant for sodium level 123, reason for this consultation. Patient reports two recent episodes of vomiting with minimal output, denies frequent urination, denies diarrhea, endorses high fluid intake.  Medications include hydrochlorothiazide, metformin, lisinopril, denies use of naproxen. Hypotonic hyponatremia, with hypovolemia, multifactorial, likely intravascular volume depletion 2/2 vomiting in the setting of thiazide diuretic administration. Serum Cl- (corrected) 90 on admission, Alvarez 23, UCl<20, UOsm 289, will need to repeat once volume resuscitated. Alvarez+K:Serum Na: 0.7. HAGMA, metabolic acidosis 2/2 lactic acidosis likely 2/2 dehydration in the setting of metformin administration and metabolic alkalosis 2/2 vomiting  Hypokalemia 2/2 GI losses and diuretics, replaced  HTN, on metoprolol, lisinopril-HCTZ on hold  DM, metformin on hold  CAD, on atorvastatin  ETOH abuse  Transaminitis   Pneumonia, CAP vs aspiration, on Unasyn     Plan:    BMP q4h  Strict intake and output  Notify renal if UO > 100 cc/hr  Fluid restriction,1 L  Continue to hold Lisinopril-HCTZ  Goal is to keep sodium around 124 today  D5W 125 cc/hr x 2 hours only (250 cc bolus)    Thank you Dr. Gabriel Camejo, for allowing us to participate in the care of [facility].     Electronically signed by OANH Faye CNP on 11/27/2022 at 2:10 PM

## 2022-11-27 NOTE — ED NOTES
Attempted to all report to floor RN. That RN is currently in CT with another patient and will call back to get report once back on the floor.       Nazia Bray RN  11/26/22 2010

## 2022-11-27 NOTE — PLAN OF CARE
Problem: Safety - Adult  Goal: Free from fall injury  11/27/2022 0854 by Marcela Carbone RN  Outcome: Progressing     Problem: Neurosensory - Adult  Goal: Absence of seizures  11/27/2022 0854 by Marcela Carbone RN  Outcome: Progressing     Problem: Neurosensory - Adult  Goal: Remains free of injury related to seizures activity  11/27/2022 0854 by Marcela Carbone RN  Outcome: Progressing     Problem: Respiratory - Adult  Goal: Achieves optimal ventilation and oxygenation  11/27/2022 0854 by Marcela Carbone RN  Outcome: Progressing     Problem: Cardiovascular - Adult  Goal: Absence of cardiac dysrhythmias or at baseline  11/27/2022 0854 by Marcela Carbone RN  Outcome: Progressing     Problem: Skin/Tissue Integrity - Adult  Goal: Skin integrity remains intact  11/27/2022 0854 by Marcela Carbone RN  Outcome: Progressing  Flowsheets (Taken 11/27/2022 7530)  Skin Integrity Remains Intact: Monitor for areas of redness and/or skin breakdown     Problem: Gastrointestinal - Adult  Goal: Minimal or absence of nausea and vomiting  11/27/2022 0854 by Marcela Carbone RN  Outcome: Progressing     Problem: Genitourinary - Adult  Goal: Absence of urinary retention  11/27/2022 0854 by Marcela Carbone RN  Outcome: Progressing     Problem: Metabolic/Fluid and Electrolytes - Adult  Goal: Electrolytes maintained within normal limits  Outcome: Progressing     Problem: Metabolic/Fluid and Electrolytes - Adult  Goal: Hemodynamic stability and optimal renal function maintained  Outcome: Progressing     Problem: Metabolic/Fluid and Electrolytes - Adult  Goal: Glucose maintained within prescribed range  Outcome: Progressing

## 2022-11-27 NOTE — CONSULTS
Re[facility]  Internal Medicine Residency Program  Consult Note  [facility]    Patient:  Loco Tijerina 59 y.o. male MRN: [id number]     Date of Service: 11/27/2022    Hospital Day: 2      Chief complaint: Vomiting and episodes of falling   History of Present Illness   The patient is a 59 y.o. male with a past medical history of hypertension, alcohol abuse, chewing tobacco abuse, and DM (per patient he was diagnosed 3 years ago currently not on any insulin medication), bilateral lower extremity paresthesia who presents to the emergency department complaining of 2 episodes of nonbloody vomiting and 2 episodes of falling. He described episodes of falling as he would walk several steps before suddenly falling to the ground he would get up immediately. There was no loss of consciousness, no loss of balance, no headaches, no dizziness, no lightheadedness, no urinary or bowel incontinence, no postictal state, no abnormal movements, and no tongue bite. He denies any previous similar episodes in the past.  He denies any chest pain shortness of breath or palpitations. Review of system is unremarkable. ED Course: Patient had to be put on a nonrebreather due to worsening SPO2. He also presented hypertensive with a blood pressure 147/75. Labs were remarkable for hyponatremia at 123, hypokalemia 3.2. Lactic acid was initially 4.5 with an anion gap of 20. Procalcitonin 2.26. Troponin trend 16-16. ABG was significant for respiratory alkalosis. CT imaging of the head was unremarkable. However, CT scanning of the chest with contrast revealed multiple bilateral groundglass infiltrates predominantly in the left upper and lower lobe consistent with pneumonia. It also showed a 5 mm pleural-based nodule that was not present on the previous imaging in 2014. Repeat sodium levels were 119.   Nephrology were consulted and decision was made to admit patient to the medical ICU for further evaluation and management. ED Meds:   Patient was given 2 g of Rocephin and 100 mg of Vibramycin. Decadron 10 mg. Folic acid/thiamine/multivitamin. ED Fluids:   Patient was given IV dextrose 5% and normal saline at 100 cc/h    Past Medical History:      Diagnosis Date    Diabetes ([address]     Hypertension        Past Surgical History:        Procedure Laterality Date    ECHO COMPL W DOP COLOR FLOW  1/17/2012         FEMUR FRACTURE SURGERY      TONSILLECTOMY         Medications Prior to Admission:    Prior to Admission medications    Medication Sig Start Date End Date Taking? Authorizing Provider   metoprolol succinate (TOPROL XL) 50 MG extended release tablet Take 50 mg by mouth every morning   Yes Historical Provider, MD   atorvastatin (LIPITOR) 40 MG tablet Take 40 mg by mouth every morning   Yes Historical Provider, MD   Cholecalciferol (VITAMIN D3) 25 MCG (1000 UT) CAPS Take 1,000 Units by mouth every morning   Yes Historical Provider, MD   lisinopril-hydroCHLOROthiazide (PRINZIDE;ZESTORETIC) 20-25 MG per tablet Take 1 tablet by mouth every morning   Yes Historical Provider, MD   amLODIPine (NORVASC) 10 MG tablet Take 10 mg by mouth every morning   Yes Historical Provider, MD   lisinopril (PRINIVIL;ZESTRIL) 20 MG tablet Take 20 mg by mouth every morning 3/30/19   Historical Provider, MD       Allergies:  Patient has no known allergies. Social History:   TOBACCO:  Chewing tobacco for over 40 years  ETOH:   reports current alcohol use. Family History:       Problem Relation Age of Onset    Heart Disease Father        REVIEW OF SYSTEMS:    Constitutional: No fever, no chills, no change in weight; good appetite  HEENT: No blurred vision, no ear problems, no sore throat, no rhinorrhea. Respiratory: No cough, no sputum production, no pleuritic chest pain, no shortness of breath  Cardiology: No angina, no dyspnea on exertion, no paroxysmal nocturnal dyspnea, no orthopnea, no palpitation, no leg swelling. RBC 4.94 11/26/2022 02:46 PM    HGB 15.6 11/26/2022 02:46 PM    HCT 42.3 11/26/2022 02:46 PM    MCV 85.6 11/26/2022 02:46 PM    RDW 11.9 11/26/2022 02:46 PM     11/26/2022 02:46 PM     BMP:    Lab Results   Component Value Date/Time     11/27/2022 12:53 AM    K 3.7 11/27/2022 12:53 AM    K 3.2 11/26/2022 07:48 PM    CL 81 11/27/2022 12:53 AM    CO2 26 11/27/2022 12:53 AM    BUN 13 11/27/2022 12:53 AM       Imaging Studies:     CT HEAD WO CONTRAST    Result Date: 11/26/2022  EXAMINATION: CT OF THE HEAD WITHOUT CONTRAST  11/26/2022 2:56 pm TECHNIQUE: CT of the head was performed without the administration of intravenous contrast. Automated exposure control, iterative reconstruction, and/or weight based adjustment of the mA/kV was utilized to reduce the radiation dose to as low as reasonably achievable. COMPARISON: None. HISTORY: ORDERING SYSTEM PROVIDED HISTORY: syncope TECHNOLOGIST PROVIDED HISTORY: Has a \"code stroke\" or \"stroke alert\" been called? ->No Reason for exam:->syncope Decision Support Exception - unselect if not a suspected or confirmed emergency medical condition->Emergency Medical Condition ([place]) What reading provider will be dictating this exam?->CRC FINDINGS: BRAIN/VENTRICLES: There is no acute intracranial hemorrhage, mass effect or midline shift. No abnormal extra-axial fluid collection. The gray-white differentiation is maintained without evidence of an acute infarct. There is no evidence of hydrocephalus. ORBITS: The visualized portion of the orbits demonstrate no acute abnormality. SINUSES: The visualized paranasal sinuses and mastoid air cells demonstrate no acute abnormality. SOFT TISSUES/SKULL:  No acute abnormality of the visualized skull or soft tissues. No acute intracranial abnormality.      CT ABDOMEN PELVIS W IV CONTRAST Additional Contrast? None    Result Date: 11/26/2022  EXAMINATION: CT OF THE ABDOMEN AND PELVIS WITH CONTRAST 11/26/2022 2:56 pm TECHNIQUE: CT of the abdomen and pelvis was performed with the administration of intravenous contrast. Multiplanar reformatted images are provided for review. Automated exposure control, iterative reconstruction, and/or weight based adjustment of the mA/kV was utilized to reduce the radiation dose to as low as reasonably achievable. COMPARISON: 10/14/2014. HISTORY: ORDERING SYSTEM PROVIDED HISTORY: epigastric pain, syncope TECHNOLOGIST PROVIDED HISTORY: Reason for exam:->epigastric pain, syncope Additional Contrast?->None Decision Support Exception - unselect if not a suspected or confirmed emergency medical condition->Emergency Medical Condition ([place]) What reading provider will be dictating this exam?->CRC FINDINGS: Lower Chest: Limited evaluation of the lower lung fields demonstrates mild prominence of the bronchovascular markings with airspace opacification visualized most prominent in the in lower lung fields bilaterally at in the left upper lung field, no evidence of focal consolidation is seen. Bronchial wall thickening is seen recommend clinical correlation for COVID-19 disease or pneumonia. No evidence of acute cardiopulmonary disease is seen. Organs: The liver demonstrates unremarkable attenuation, no evidence of masses no evidence of intrahepatic biliary dilatation is seen. The gallbladder is unremarkable, no evidence of gallbladder wall thickening or pericholecystic  stranding. The common bile duct is unremarkable. The pancreas and spleen demonstrate no evidence of masses. The adrenal glands demonstrate unremarkable contours with no evidence of masses. The kidneys demonstrate no evidence of stones no evidence of renal masses. No evidence of hydronephrosis or hydroureter is seen. GI/Bowel: The stomach is unremarkable, no evidence of masses. No significant distention of the small and large bowel loops is visualized. The appendix is visualized and is unremarkable. Pelvis: The urinary bladder is not optimally distended.   Mild apparent bladder wall thickening is seen. The prostate gland is unremarkable. No evidence of free fluid in the pelvis. No evidence of pelvic mass is seen. Subtle inguinal hernias seen. Peritoneum/Retroperitoneum: No evidence of free fluid or air within the peritoneal cavity. Bones/Soft Tissues: Abdominal wall soft tissues are unremarkable. Degenerative changes of the lumbar spine visualized. No evidence of acute abdominal/pelvic pathology is seen. Airspace opacification visualized in the lung fields would recommend clinical correlation for COVID-19 disease or pneumonia. CTA PULMONARY W CONTRAST    Result Date: 11/26/2022  EXAMINATION: CTA OF THE CHEST 11/26/2022 2:56 pm TECHNIQUE: CTA of the chest was performed after the administration of intravenous contrast.  Multiplanar reformatted images are provided for review. MIP images are provided for review. Automated exposure control, iterative reconstruction, and/or weight based adjustment of the mA/kV was utilized to reduce the radiation dose to as low as reasonably achievable. COMPARISON: None. HISTORY: ORDERING SYSTEM PROVIDED HISTORY: multiple syncopal episodes, r/o pe TECHNOLOGIST PROVIDED HISTORY: Reason for exam:->multiple syncopal episodes, r/o pe Decision Support Exception - unselect if not a suspected or confirmed emergency medical condition->Emergency Medical Condition ([place]) What reading provider will be dictating this exam?->CRC FINDINGS: Pulmonary Arteries: Pulmonary arteries are adequately opacified for evaluation. No evidence of intraluminal filling defect to suggest pulmonary embolism. Main pulmonary artery is normal in caliber. Mediastinum: No evidence of mediastinal lymphadenopathy. The heart and pericardium demonstrate no acute abnormality. There is no acute abnormality of the thoracic aorta. Lungs/pleura: Ground-glass airspace disease is seen within the left upper lobe and left lower lobe.   There is also more vague ground-glass airspace disease seen within the right lower lobe and to a lesser extent within the right middle lobe. There is a 5 mm pleural based nodule seen within the left lower lobe on axial image number 124. Upper Abdomen: Limited images of the upper abdomen are unremarkable. Soft Tissues/Bones: No acute bone or soft tissue abnormality. 1.  There is no pulmonary embolus 2. Multifocal bilateral ground-glass pneumonia more prominent within the left upper lobe and left lower lobe.  3.  5 mm pleural based nodule seen within the left lower lobe on axial image number 124       EKG:     Resident's Assessment and Plan       Neuro    R/o Seizures  Neurology consulted per primary team    Cardio    R/o syncope   Cardiology consulted per primary team   Carotid bilateral U/S  Repeat Echo    Follow orthostatics     Hx of HTN   Will resume home Lopressor and Norvasc  Will hold Lisinopril/HCTZ    Hx HFpEF (Stage 1 DD)  Last Echo 65% with Stage one diastolic dysfunction - 4791    Pulmonary    AHRF 2/2 CAP vs aspiration PNA   CT chest show bilateral ground glass opacities, significant in left upper and lower lobe  Follow pancultures  Start Unasyn 3 g q6 x 5 days    Pleural Nodule  5 mm pleural based nodule  Not documented on previous imaging  Will f/u as OP    GI       Renal    Hypotonic Euvolemic Hyponatremia, r/o SIADH 2/2 Pneumonia   Follow TSH, Cortisol levels   D/C diuretics  Follow nephrology recommendations   BMP q4hrs per nephrology  Goal Na is <127 in 24 hrs   Unlikely beer potomania given urine osm >100  1L fluid restriction        Hypokalemia resolved      HAGMA 2/2 LA , resolved        Endocrine    Hx DM  Per patient was dx 3 years ago  Follow HbA1c levels   Hx LE paraesthesia 2/2 DM   Start low-dose sliding scale  Hypoglycemia protocol  Follow blood sugar level before every meal/at bedtime      Infection    CAP vs Aspiration PNA  CT chest findings show Multifocal bilateral ground-glass pneumonia more prominent within the left upper lobe and left lower lobe  Start Unasyn   Elevated procalcitonin  WB WNL  Follow pancultures    Heme/Onc    Thrombocytopenia, likely 2/2 reactive in setting of infection   Follow PBS  Follow PT/INR  Follow Haptoglobin   Follow fibrinogen     MSK       Skin       Psych       Other    Hx Alcohol abuse  Reported last drink was 4 days ago  Reports drinking a 6 pack daily   Lipase levels on admission 96  Start high dose thiamine and folic acid     Hx Tobacco use (chewing tobacco)     PT/OT evaluation: not onboard   DVT prophylaxis/ GI prophylaxis: Lovenox 40 mg daily / none   Disposition: Continue MICU care    Nasra Masters MD, PGY-1   Attending physician: Dr. Vishal Ortega  Department of Pulmonary, Critical Care and Sleep Medicine  [place]  Department of Internal Medicine      During multidisciplinary team rounds Reggie Cevallos is a 59 y.o. male was seen, examined and discussed. This is confirmation that I have personally seen and examined the patient and that the key elements of the encounter were performed by me (> 85 % time). The medications & laboratory data was discussed and adjusted where necessary. The radiographic images were reviewed or with radiologist or consultant if felt dis-concordant with the exam or history. The above findings were corroborated, plans confirmed and changes made if needed. Family is updated at the bedside as available. Key issues of the case were discussed among consultants. Critical Care time is documented if appropriate.       Janina Raman, DO, FACP, FCCP, [facility],

## 2022-11-27 NOTE — H&P
INTERNAL MEDICINE HISTORY AND PHYSICAL EXAM     CHIEF COMPLAINT:   Chief Complaint   Patient presents with    Loss of Consciousness     Patient having multiple syncopal episodes w/ vomiting . Patient orthostatic positive per EMS        ASSESSMENT AND PLAN  Principal Problem:    Hyponatremia  Active Problems:    Syncope and collapse  Resolved Problems:    * No resolved hospital problems. *    59 years old male patient who has history of hypertension diabetes and daily alcohol intake, consuming 12 pack every day, patient apparently had multiple episodes of syncope at home he was brought into the emergency department CT head was negative CT chest was negative for pulmonary embolism but showed multifocal pneumonia bilaterally, patient respiratory viral panel including COVID-19 testing was negative. Patient was noted to have severe hyponatremia.   He was requiring high flow nasal cannula in emergency department, initial sodium was 123 patient was given D5W normal saline emergency department, repeat sodium was 119, patient is being admitted to intensive care unit on hospital medicine service    Severe hyponatremia  Likely beer potomania  Patient initial sodium 123-most recent sodium 119  Serum osmolality 254, urine osmolality 289  Urine sodium 23, urine chloride less than 20  Check TSH and a.m. cortisol levels  Patient will likely need evaluation for 3% saline treatment with fluid restriction  Correction goal 6 to 8 mEq per 24 hours  Monitor sodium level every 4 hours  Will consult nephrology  We will also consult critical    Sepsis with Hypoxic respiratory failure  In the setting of pneumonia  CT chest negative for pulmonary embolism, it did show multifocal bilateral groundglass pneumonia prominent within the left upper lobe and left lower lobe, also showed 5 mm pleural-based nodule seen within the left lower lobe  COVID-19 testing negative  Patient was given 1 dose of Rocephin and doxycycline and dexamethasone in emergency department  Currently on Unasyn   Patient requiring high flow nasal cannula  We will consult pulmonary medicine and critical care    Syncope  Multiple episodes prior to coming to the hospital  Differential diagnosis: Hyponatremia, seizure, syncope due to dehydration, in the setting of sepsis, rule out cardiac/cardiovascular etiology  CT head negative for any acute findings  CT chest negative for pulmonary embolism  Will request echocardiogram and carotid artery ultrasound  Will consult cardiology  Will consult neurology    Electrolyte abnormality likely due to excessive alcohol intake  Hypokalemia  Hypomagnesemia  Continue to monitor, replace as needed    Lactic acidosis  Likely due to dehydration  Initial lactic acid 4.5 on presentation, repeat lactic acid improved at 1.9    Daily alcohol use  Patient used 12 pack every day  [facility] protocol for alcohol withdrawal    Elevated troponin  Flat trend  Cant exclude ACS  Check echo  Cardio consult     History of coronary artery disease  At home on Toprol 50/day, aspirin 81 mg a day, Lipitor 40 mg a day, lisinopril-hydrochlorothiazide 20/25    History of diabetes  Patient on metformin 500 mg a day  We will hold metformin while inpatient  Continues on sliding scale  Diabetic diet    History of hypertension  Patient on Toprol 50, lisinopril-hydrochlorothiazide 20/25/day, Norvasc 10 mg/day    DVT prophylaxis: Subcutaneous heparin  CODE STATUS: Patient is a full code  Discharge plan: Patient can be discharged next 3 to 4 days to home with and without home health.,  Pending clinical improvement, pending work-up and clearance by consulting services.     HISTORY OF PRESENTING ILLNESS:   59years old male patient who has history of hypertension diabetes and daily alcohol intake, consuming 12 pack every day, patient apparently had multiple episodes of syncope at home he was brought into the emergency department CT head was negative CT chest was negative for pulmonary embolism but showed multifocal pneumonia bilaterally, patient respiratory viral panel including COVID-19 testing was negative. Patient was noted to have severe hyponatremia. He was requiring high flow nasal cannula in emergency department, initial sodium was 123 patient was given D5W normal saline emergency department, repeat sodium was 119, patient is being admitted to intensive care unit on hospital medicine service    PAST MEDICAL HISTORY  Past Medical History:   Diagnosis Date    Hypertension        PAST SURGICAL HISTORY  Past Surgical History:   Procedure Laterality Date    ECHO COMPL W DOP COLOR FLOW  1/17/2012         FEMUR FRACTURE SURGERY      TONSILLECTOMY         FAMILY HISTORY  Family History   Problem Relation Age of Onset    Heart Disease Father           SOCIAL HISTORY   reports that he has quit smoking. He has never used smokeless tobacco. He reports current alcohol use. He reports that he does not use drugs. MEDICATIONS   Not in a hospital admission.   Current Facility-Administered Medications   Medication Dose Route Frequency Provider Last Rate Last Admin    sodium chloride flush 0.9 % injection 5-40 mL  5-40 mL IntraVENous 2 times per day Adrianne Kareem, DO        sodium chloride flush 0.9 % injection 5-40 mL  5-40 mL IntraVENous PRN Adrianne Kareem, DO        0.9 % sodium chloride infusion   IntraVENous PRN Adrianne Kareem, DO        multivitamin 1 tablet  1 tablet Oral Daily Adrianne Kareem, DO   1 tablet at 11/26/22 1645    LORazepam (ATIVAN) tablet 1 mg  1 mg Oral Q1H PRN Adrianne Kareem, DO        Or    LORazepam (ATIVAN) injection 1 mg  1 mg IntraVENous Q1H PRN Adrianne Kareem, DO        Or    LORazepam (ATIVAN) tablet 2 mg  2 mg Oral Q1H PRN Adrianne Kareem, DO        Or    LORazepam (ATIVAN) injection 2 mg  2 mg IntraVENous Q1H PRN Adrianne Kareem, DO        Or    LORazepam (ATIVAN) tablet 3 mg  3 mg Oral Q1H PRN Augusta Leonor, DO        Or    LORazepam (ATIVAN) injection 3 mg  3 mg IntraVENous Q1H PRN Augusta Leonor, DO        Or    LORazepam (ATIVAN) tablet 4 mg  4 mg Oral Q1H PRN Augusta Galvez, DO        Or    LORazepam (ATIVAN) injection 4 mg  4 mg IntraVENous Q1H PRN Marcie Tamayo DO        dextrose 5 % and 0.9 % sodium chloride infusion   IntraVENous Continuous Augusta Leonor,  mL/hr at 11/26/22 1708 New Bag at 11/26/22 1708    sodium chloride flush 0.9 % injection 5-40 mL  5-40 mL IntraVENous 2 times per day Pearl Douglass MD        sodium chloride flush 0.9 % injection 5-40 mL  5-40 mL IntraVENous PRN Pearl Douglass MD        0.9 % sodium chloride infusion   IntraVENous PRN Pearl Douglass MD        enoxaparin (LOVENOX) injection 40 mg  40 mg SubCUTAneous Daily Pearl Douglass MD        ondansetron (ZOFRAN-ODT) disintegrating tablet 4 mg  4 mg Oral Q8H PRN Pearl Douglass MD        Or    ondansetron (ZOFRAN) injection 4 mg  4 mg IntraVENous Q6H PRN Pearl Douglass MD        polyethylene glycol (GLYCOLAX) packet 17 g  17 g Oral Daily PRN Pearl Douglass MD        acetaminophen (TYLENOL) tablet 650 mg  650 mg Oral Q6H PRN Pearl Douglass MD        Or    acetaminophen (TYLENOL) suppository 650 mg  650 mg Rectal Q6H PRN Pearl Douglass MD        sodium chloride flush 0.9 % injection 5-40 mL  5-40 mL IntraVENous 2 times per day Pearl Douglass MD        sodium chloride flush 0.9 % injection 5-40 mL  5-40 mL IntraVENous PRN Pearl Douglass MD        0.9 % sodium chloride infusion   IntraVENous PRN Pearl Douglass MD        enoxaparin (LOVENOX) injection 40 mg  40 mg SubCUTAneous Daily Pearl Douglass MD        ondansetron (ZOFRAN-ODT) disintegrating tablet 4 mg  4 mg Oral Q8H PRN Pearl Douglass MD        Or    ondansetron (ZOFRAN) injection 4 mg  4 mg IntraVENous Q6H PRN Pearl Douglass MD        polyethylene glycol (GLYCOLAX) packet 17 g  17 g Oral Daily PRN Pearl Douglass MD acetaminophen (TYLENOL) tablet 650 mg  650 mg Oral Q6H PRN Katharine Ríos MD        Or    acetaminophen (TYLENOL) suppository 650 mg  650 mg Rectal Q6H PRN Katharine Ríos MD         Current Outpatient Medications   Medication Sig Dispense Refill    metoprolol succinate (TOPROL XL) 50 MG extended release tablet Take 50 mg by mouth every morning      atorvastatin (LIPITOR) 40 MG tablet Take 40 mg by mouth every morning      Cholecalciferol (VITAMIN D3) 25 MCG (1000 UT) CAPS Take 1,000 Units by mouth every morning      lisinopril-hydroCHLOROthiazide (PRINZIDE;ZESTORETIC) 20-25 MG per tablet Take 1 tablet by mouth every morning      amLODIPine (NORVASC) 10 MG tablet Take 10 mg by mouth every morning      lisinopril (PRINIVIL;ZESTRIL) 20 MG tablet Take 20 mg by mouth every morning  3     Prior to Admission medications    Medication Sig Start Date End Date Taking? Authorizing Provider   metoprolol succinate (TOPROL XL) 50 MG extended release tablet Take 50 mg by mouth every morning   Yes Historical Provider, MD   atorvastatin (LIPITOR) 40 MG tablet Take 40 mg by mouth every morning   Yes Historical Provider, MD   Cholecalciferol (VITAMIN D3) 25 MCG (1000 UT) CAPS Take 1,000 Units by mouth every morning   Yes Historical Provider, MD   lisinopril-hydroCHLOROthiazide (PRINZIDE;ZESTORETIC) 20-25 MG per tablet Take 1 tablet by mouth every morning   Yes Historical Provider, MD   amLODIPine (NORVASC) 10 MG tablet Take 10 mg by mouth every morning   Yes Historical Provider, MD   lisinopril (PRINIVIL;ZESTRIL) 20 MG tablet Take 20 mg by mouth every morning 3/30/19   Historical Provider, MD       ALLERGIES   Patient has no known allergies. REVIEW OF SYSTEMS:  12 point review of system was done in detail with the patient and is negative except as above in HPI.     PHYSICAL EXAM:  VS: /83   Pulse 90   Temp 99 °F (37.2 °C)   Resp 22   Ht 6' (1.829 m)   Wt 215 lb (97.5 kg)   SpO2 98%   BMI 29.16 kg/m²   General Appearance: Mild distress due to difficulty with breathing  HEENT: normocephalic and atraumatic, pupils equal, round, and reactive to light, Ssupple and non-tender without mass, no thyromegaly   Cardiovascular: normal rate, regular rhythm, normal S1 and S2, no murmurs, rubs, clicks, or gallops, distal pulses intact, no carotid bruits, no JVD  Pulmonary/Chest: clear to auscultation bilaterally- no wheezes, rales or rhonchi, normal air movement, no respiratory distress  Abdomen: soft, non-tender, non-distended, normal bowel sounds, no masses   Extremities: no cyanosis, clubbing or edema, pulse   Musculoskeletal: normal range of motion, no joint swelling, deformity or tenderness  Neurological: alert, oriented, normal speech, no focal findings or movement disorder noted  Skin: warm and dry, no rash or erythema    LABS:  Recent Results (from the past 24 hour(s))   CBC with Auto Differential    Collection Time: 11/26/22  2:46 PM   Result Value Ref Range    WBC 9.4 4.5 - 11.5 E9/L    RBC 4.94 3.80 - 5.80 E12/L    Hemoglobin 15.6 12.5 - 16.5 g/dL    Hematocrit 42.3 37.0 - 54.0 %    MCV 85.6 80.0 - 99.9 fL    MCH 31.6 26.0 - 35.0 pg    MCHC 36.9 (H) 32.0 - 34.5 %    RDW 11.9 11.5 - 15.0 fL    Platelets 839 (L) 588 - 450 E9/L    MPV 9.4 7.0 - 12.0 fL    Neutrophils % 96.5 (H) 43.0 - 80.0 %    Lymphocytes % 0.9 (L) 20.0 - 42.0 %    Monocytes % 2.6 2.0 - 12.0 %    Eosinophils % 0.0 0.0 - 6.0 %    Basophils % 0.1 0.0 - 2.0 %    Neutrophils Absolute 9.12 (H) 1.80 - 7.30 E9/L    Lymphocytes Absolute 0.09 (L) 1.50 - 4.00 E9/L    Monocytes Absolute 0.28 0.10 - 0.95 E9/L    Eosinophils Absolute 0.00 (L) 0.05 - 0.50 E9/L    Basophils Absolute 0.00 0.00 - 0.20 E9/L    Poikilocytes 1+     Stevenson Cells 1+     Ovalocytes 1+    Comprehensive Metabolic Panel w/ Reflex to MG    Collection Time: 11/26/22  2:46 PM   Result Value Ref Range    Sodium 123 (L) 132 - 146 mmol/L    Potassium reflex Magnesium 3.2 (L) 3.5 - 5.0 mmol/L    Chloride 78 (L) 98 - 107 mmol/L    CO2 25 22 - 29 mmol/L    Anion Gap 20 (H) 7 - 16 mmol/L    Glucose 175 (H) 74 - 99 mg/dL    BUN 9 6 - 20 mg/dL    Creatinine 1.1 0.7 - 1.2 mg/dL    Est, Glom Filt Rate >60 >=60 mL/min/1.73    Calcium 9.3 8.6 - 10.2 mg/dL    Total Protein 8.7 (H) 6.4 - 8.3 g/dL    Albumin 4.0 3.5 - 5.2 g/dL    Total Bilirubin 1.6 (H) 0.0 - 1.2 mg/dL    Alkaline Phosphatase 138 (H) 40 - 129 U/L    ALT 77 (H) 0 - 40 U/L    AST 83 (H) 0 - 39 U/L   Troponin    Collection Time: 11/26/22  2:46 PM   Result Value Ref Range    Troponin, High Sensitivity 16 (H) 0 - 11 ng/L   Brain Natriuretic Peptide    Collection Time: 11/26/22  2:46 PM   Result Value Ref Range    Pro- (H) 0 - 125 pg/mL   Lipase    Collection Time: 11/26/22  2:46 PM   Result Value Ref Range    Lipase 98 (H) 13 - 60 U/L   Lactic Acid    Collection Time: 11/26/22  2:46 PM   Result Value Ref Range    Lactic Acid 4.5 (HH) 0.5 - 2.2 mmol/L   Urinalysis with Microscopic    Collection Time: 11/26/22  2:46 PM   Result Value Ref Range    Color, UA Yellow Straw/Yellow    Clarity, UA Clear Clear    Glucose, Ur Negative Negative mg/dL    Bilirubin Urine Negative Negative    Ketones, Urine 15 (A) Negative mg/dL    Specific Gravity, UA 1.015 1.005 - 1.030    Blood, Urine Negative Negative    pH, UA 6.5 5.0 - 9.0    Protein, UA Negative Negative mg/dL    Urobilinogen, Urine 4.0 (A) <2.0 E.U./dL    Nitrite, Urine Negative Negative    Leukocyte Esterase, Urine Negative Negative    WBC, UA NONE 0 - 5 /HPF    RBC, UA 0-1 0 - 2 /HPF    Bacteria, UA RARE (A) None Seen /HPF   Magnesium    Collection Time: 11/26/22  2:46 PM   Result Value Ref Range    Magnesium 1.4 (L) 1.6 - 2.6 mg/dL   Blood Gas, Arterial    Collection Time: 11/26/22  2:46 PM   Result Value Ref Range    Date Analyzed [id number]     Time Analyzed 1446     Source: Blood Arterial     pH, Blood Gas 7.520 (H) 7.350 - 7.450    PCO2 29.2 (L) 35.0 - 45.0 mmHg    PO2 61.8 (L) 75.0 - 100.0 mmHg    HCO3 23.3 22.0 - 26.0 mmol/L    B.E. 1.6 -3.0 - 3.0 mmol/L    O2 Sat 92.3 92.0 - 98.5 %    O2Hb 90.1 (L) 94.0 - 97.0 %    COHb 2.1 (H) 0.0 - 1.5 %    MetHb 0.3 0.0 - 1.5 %    O2 Content 19.4 mL/dL    HHb 7.5 (H) 0.0 - 5.0 %    tHb (est) 15.3 11.5 - 16.5 g/dL    Mode NRB 15L     Date Of Collection      Time Collected      Pt Temp 37.0 C     ID [id number]     Lab [id number]     Critical(s) Notified .  No Critical Values    COVID-19, Rapid    Collection Time: 11/26/22  3:07 PM    Specimen: Nasopharyngeal Swab   Result Value Ref Range    SARS-CoV-2, NAAT Not Detected Not Detected   Rapid influenza A/B antigens    Collection Time: 11/26/22  3:07 PM    Specimen: Nasopharyngeal   Result Value Ref Range    Influenza A by PCR Not Detected Not Detected    Influenza B by PCR Not Detected Not Detected   Respiratory Panel, Molecular, with COVID-19 (Restricted: peds pts or suitable admitted adults)    Collection Time: 11/26/22  4:33 PM    Specimen: Nasopharyngeal   Result Value Ref Range    Adenovirus by PCR Not Detected Not Detected    Bordetella parapertussis by PCR Not Detected Not Detected    Bordetella pertussis by PCR Not Detected Not Detected    Chlamydophilia pneumoniae by PCR Not Detected Not Detected    Coronavirus 229E by PCR Not Detected Not Detected    Coronavirus HKU1 by PCR Not Detected Not Detected    Coronavirus NL63 by PCR Not Detected Not Detected    Coronavirus OC43 by PCR Not Detected Not Detected    SARS-CoV-2, PCR Not Detected Not Detected    Human Metapneumovirus by PCR Not Detected Not Detected    Human Rhinovirus/Enterovirus by PCR Not Detected Not Detected    Influenza A by PCR Not Detected Not Detected    Influenza B by PCR Not Detected Not Detected    Mycoplasma pneumoniae by PCR Not Detected Not Detected    Parainfluenza Virus 1 by PCR Not Detected Not Detected    Parainfluenza Virus 2 by PCR Not Detected Not Detected    Parainfluenza Virus 3 by PCR Not Detected Not Detected    Parainfluenza Virus 4 by PCR Not Detected Not Detected    Respiratory Syncytial Virus by PCR Not Detected Not Detected   URINE ELECTROLYTES    Collection Time: 11/26/22  4:33 PM   Result Value Ref Range    Sodium, Ur 23 Not Established mmol/L    Potassium, Ur 62.4 Not Established mmol/L    Chloride <20 Not Established mmol/L   OSMOLALITY, URINE    Collection Time: 11/26/22  4:33 PM   Result Value Ref Range    Osmolality, Ur 289 (L) 300 - 900 mOsm/kg   OSMOLALITY, SERUM    Collection Time: 11/26/22  4:33 PM   Result Value Ref Range    Osmolality 254 (L) 285 - 310 mOsm/Kg   Troponin    Collection Time: 11/26/22  4:33 PM   Result Value Ref Range    Troponin, High Sensitivity 16 (H) 0 - 11 ng/L       RADIOLOGY:  CT HEAD WO CONTRAST    Result Date: 11/26/2022  EXAMINATION: CT OF THE HEAD WITHOUT CONTRAST  11/26/2022 2:56 pm TECHNIQUE: CT of the head was performed without the administration of intravenous contrast. Automated exposure control, iterative reconstruction, and/or weight based adjustment of the mA/kV was utilized to reduce the radiation dose to as low as reasonably achievable. COMPARISON: None. HISTORY: ORDERING SYSTEM PROVIDED HISTORY: syncope TECHNOLOGIST PROVIDED HISTORY: Has a \"code stroke\" or \"stroke alert\" been called? ->No Reason for exam:->syncope Decision Support Exception - unselect if not a suspected or confirmed emergency medical condition->Emergency Medical Condition ([place]) What reading provider will be dictating this exam?->CRC FINDINGS: BRAIN/VENTRICLES: There is no acute intracranial hemorrhage, mass effect or midline shift. No abnormal extra-axial fluid collection. The gray-white differentiation is maintained without evidence of an acute infarct. There is no evidence of hydrocephalus. ORBITS: The visualized portion of the orbits demonstrate no acute abnormality. SINUSES: The visualized paranasal sinuses and mastoid air cells demonstrate no acute abnormality.  SOFT TISSUES/SKULL:  No acute abnormality of the visualized skull or soft tissues. No acute intracranial abnormality. CT ABDOMEN PELVIS W IV CONTRAST Additional Contrast? None    Result Date: 11/26/2022  EXAMINATION: CT OF THE ABDOMEN AND PELVIS WITH CONTRAST 11/26/2022 2:56 pm TECHNIQUE: CT of the abdomen and pelvis was performed with the administration of intravenous contrast. Multiplanar reformatted images are provided for review. Automated exposure control, iterative reconstruction, and/or weight based adjustment of the mA/kV was utilized to reduce the radiation dose to as low as reasonably achievable. COMPARISON: 10/14/2014. HISTORY: ORDERING SYSTEM PROVIDED HISTORY: epigastric pain, syncope TECHNOLOGIST PROVIDED HISTORY: Reason for exam:->epigastric pain, syncope Additional Contrast?->None Decision Support Exception - unselect if not a suspected or confirmed emergency medical condition->Emergency Medical Condition ([place]) What reading provider will be dictating this exam?->CRC FINDINGS: Lower Chest: Limited evaluation of the lower lung fields demonstrates mild prominence of the bronchovascular markings with airspace opacification visualized most prominent in the in lower lung fields bilaterally at in the left upper lung field, no evidence of focal consolidation is seen. Bronchial wall thickening is seen recommend clinical correlation for COVID-19 disease or pneumonia. No evidence of acute cardiopulmonary disease is seen. Organs: The liver demonstrates unremarkable attenuation, no evidence of masses no evidence of intrahepatic biliary dilatation is seen. The gallbladder is unremarkable, no evidence of gallbladder wall thickening or pericholecystic  stranding. The common bile duct is unremarkable. The pancreas and spleen demonstrate no evidence of masses. The adrenal glands demonstrate unremarkable contours with no evidence of masses. The kidneys demonstrate no evidence of stones no evidence of renal masses. No evidence of hydronephrosis or hydroureter is seen. GI/Bowel:  The stomach is unremarkable, no evidence of masses. No significant distention of the small and large bowel loops is visualized. The appendix is visualized and is unremarkable. Pelvis: The urinary bladder is not optimally distended. Mild apparent bladder wall thickening is seen. The prostate gland is unremarkable. No evidence of free fluid in the pelvis. No evidence of pelvic mass is seen. Subtle inguinal hernias seen. Peritoneum/Retroperitoneum: No evidence of free fluid or air within the peritoneal cavity. Bones/Soft Tissues: Abdominal wall soft tissues are unremarkable. Degenerative changes of the lumbar spine visualized. No evidence of acute abdominal/pelvic pathology is seen. Airspace opacification visualized in the lung fields would recommend clinical correlation for COVID-19 disease or pneumonia. CTA PULMONARY W CONTRAST    Result Date: 11/26/2022  EXAMINATION: CTA OF THE CHEST 11/26/2022 2:56 pm TECHNIQUE: CTA of the chest was performed after the administration of intravenous contrast.  Multiplanar reformatted images are provided for review. MIP images are provided for review. Automated exposure control, iterative reconstruction, and/or weight based adjustment of the mA/kV was utilized to reduce the radiation dose to as low as reasonably achievable. COMPARISON: None. HISTORY: ORDERING SYSTEM PROVIDED HISTORY: multiple syncopal episodes, r/o pe TECHNOLOGIST PROVIDED HISTORY: Reason for exam:->multiple syncopal episodes, r/o pe Decision Support Exception - unselect if not a suspected or confirmed emergency medical condition->Emergency Medical Condition ([place]) What reading provider will be dictating this exam?->CRC FINDINGS: Pulmonary Arteries: Pulmonary arteries are adequately opacified for evaluation. No evidence of intraluminal filling defect to suggest pulmonary embolism. Main pulmonary artery is normal in caliber. Mediastinum: No evidence of mediastinal lymphadenopathy.   The heart and pericardium demonstrate no acute abnormality. There is no acute abnormality of the thoracic aorta. Lungs/pleura: Ground-glass airspace disease is seen within the left upper lobe and left lower lobe. There is also more vague ground-glass airspace disease seen within the right lower lobe and to a lesser extent within the right middle lobe. There is a 5 mm pleural based nodule seen within the left lower lobe on axial image number 124. Upper Abdomen: Limited images of the upper abdomen are unremarkable. Soft Tissues/Bones: No acute bone or soft tissue abnormality. 1.  There is no pulmonary embolus 2. Multifocal bilateral ground-glass pneumonia more prominent within the left upper lobe and left lower lobe. 3.  5 mm pleural based nodule seen within the left lower lobe on axial image number 124         35 minutes was spent in evaluating the patient, review of records and results, discussion with staff/family, etc.    NOTE: This report was transcribed using voice recognition software. Every effort was made to ensure accuracy; however, inadvertent computerized transcription errors may be present.     Fernanda Cristobal MD  [facility]   [phone]

## 2022-11-27 NOTE — CONSULTS
[address] [place] [address]  1965  Date of Service: 11/27/2022    Reason for Consultation: We were asked to see [address] [place] [address] by Dr. Dk Orlando DO  regarding possible syncope. History of Chief Complaint: This is a 59 y.o. male not previously known to our group. They have a history of alcohol abuse, chewing tobacco, former smoker, diabetes, and hypertension. He states that he had been having some falling at home. He states that he just fell and denies any syncope. He denies any chest discomfort, shortness of breath, or dyspnea. He denies any orthopnea/PND. He denies any palpitations. REVIEW OF SYSTEMS:   Heart: as above   Lungs: as above   Eyes: denies changes in vision or discharge. Ears: denies changes in hearing or pain. Nose: denies epistaxis or masses   Throat: denies sore throat or trouble swallowing. Neuro: denies numbness, tingling, tremors. Skin: denies rashes or itching. : denies hematuria, dysuria   GI: denies vomiting, diarrhea   Psych: denies mood changed, anxiety, depression.     CURRENT MEDICATIONS:  Current Facility-Administered Medications   Medication Dose Route Frequency Provider Last Rate Last Admin    folic acid (FOLVITE) tablet 1 mg  1 mg Oral Daily Samantha Thornton MD   1 mg at 11/27/22 0919    sodium chloride flush 0.9 % injection 5-40 mL  5-40 mL IntraVENous 2 times per day Cuca L Hdez, DO        sodium chloride flush 0.9 % injection 5-40 mL  5-40 mL IntraVENous PRN Cuca PIERRE Hdez, DO        0.9 % sodium chloride infusion   IntraVENous PRN Cuca L Hdez, DO        thiamine (B-1) 500 mg in sodium chloride 0.9 % 100 mL IVPB  500 mg IntraVENous Q8H Cuca L Hdez, DO   Stopped at 11/27/22 1234    Followed by    Moustapha Bella ON 11/29/2022] thiamine (B-1) 250 mg in sodium chloride 0.9 % 100 mL IVPB  250 mg IntraVENous Q24H Cuca L Hdez, DO        Followed by    Moustapha Bella ON 12/4/2022] thiamine tablet 100 mg  100 mg Oral Daily Cuca L Coffey Kacie, DO        trimethobenzamide Alphonsa Ratlarisake) injection 200 mg  200 mg IntraMUSCular Q6H PRN Cuca L Hdez, DO        chlorhexidine (PERIDEX) 0.12 % solution 15 mL  15 mL Mouth/Throat BID Cuca L Hdez, DO        perflutren lipid microspheres (DEFINITY) injection 1.5 mL  1.5 mL IntraVENous ONCE PRN Kristen Resendez,         sodium chloride flush 0.9 % injection 5-40 mL  5-40 mL IntraVENous 2 times per day Benetta Buster, DO        sodium chloride flush 0.9 % injection 5-40 mL  5-40 mL IntraVENous PRN Benetta Marshall, DO        multivitamin 1 tablet  1 tablet Oral Daily Benetta Marshall, DO   1 tablet at 11/27/22 0919    LORazepam (ATIVAN) tablet 1 mg  1 mg Oral Q1H PRN Benetta Marshall, DO        Or    LORazepam (ATIVAN) injection 1 mg  1 mg IntraVENous Q1H PRN Benetta Marshall, DO        Or    LORazepam (ATIVAN) tablet 2 mg  2 mg Oral Q1H PRN Benetta Buster, DO        Or    LORazepam (ATIVAN) injection 2 mg  2 mg IntraVENous Q1H PRN Benetta Buster, DO        Or    LORazepam (ATIVAN) tablet 3 mg  3 mg Oral Q1H PRN Benetta Marshall, DO        Or    LORazepam (ATIVAN) injection 3 mg  3 mg IntraVENous Q1H PRN Benetta Buster, DO        Or    LORazepam (ATIVAN) tablet 4 mg  4 mg Oral Q1H PRN Benetta Buster, DO        Or    LORazepam (ATIVAN) injection 4 mg  4 mg IntraVENous Q1H PRN Benetta Buster, DO        sodium chloride flush 0.9 % injection 5-40 mL  5-40 mL IntraVENous 2 times per day Daron Turner MD        sodium chloride flush 0.9 % injection 5-40 mL  5-40 mL IntraVENous PRN Daron Turner MD        sodium chloride flush 0.9 % injection 5-40 mL  5-40 mL IntraVENous 2 times per day Daron Turner MD   10 mL at 11/26/22 2146    sodium chloride flush 0.9 % injection 5-40 mL  5-40 mL IntraVENous PRN Daron Turner MD        enoxaparin (LOVENOX) injection 40 mg  40 mg SubCUTAneous Daily Daron Turner MD   40 mg at 11/27/22 0921    ampicillin-sulbactam (UNASYN) 3,000 mg in sodium chloride 0.9 % 100 mL IVPB ([id number])  3,000 mg IntraVENous [facility]6H [facility] MD Marie   Stopped at 11/27/22 1336    sodium chloride flush 0.9 % injection 5-40 mL  5-40 mL IntraVENous 2 times per day [facility] MD Marie        sodium chloride flush 0.9 % injection 5-40 mL  5-40 mL [facility]        0.9 % sodium chloride infusion   Int[facility]VENous [facility]        polyethylene glycol (GLYCOLAX) packet 17 g  17 g [facility] Marie        acetaminophen (TYLENOL) tablet 650 mg  650 mg Oral Q6H [facility] Marie        Or    acetaminophen (TYLENOL) suppository 650 mg  650 mg Rectal Q6H [facility] Marie        amLODIPine (NORVASC) tablet 10 mg  10 mg Oral QAM [place] Corporal, DO   10 mg at 11/27/22 0919    atorvastatin (LIPITOR) tablet 40 mg  40 mg Oral QAM [facility] MD Marie   40 mg at 11/27/22 0919    Vitamin D (CHOLECALCIFEROL) tablet 1,000 Units  1,000 Units Oral QAM [facility] MD Marie   1,000 Units at 11/27/22 0919    metoprolol succinate (TOPROL XL) extended release tablet 50 mg  50 mg Oral QAM [place] Corporal, DO   50 mg at 11/27/22 0919    insulin lispro (HUMALOG) injection vial 0-4 Units  0-4 Units SubCUTAneous TID WC [facility] MD Marie        insulin lispro (HUMALOG) injection vial 0-4 Units  0-4 Units SubCUTAneous Nightly [facility] MD Marie        glucose chewable tablet 16 g  4 tablet Oral PRN [facility] MD Marie        dextrose bolus 10% 125 mL  125 mL IntraVENous [facility] Marie        Or    dextrose bolus 10% 250 mL  250 mL IntraVENous PRN [facility] Yeni Salazar MD        glucagon (rDNA) injection 1 mg  1 mg SubCUTAneous PRN [facility] Yeni Salazar MD dextrose 10 % infusion   IntraVENous Continuous PRN Perla Saxena MD            ALLERGIES:  No Known Allergies    MEDICAL HISTORY:  Past Medical History:   Diagnosis Date    Diabetes ([address]     Hypertension        SURGICAL HISTORY:  Past Surgical History:   Procedure Laterality Date    ECHO COMPL W DOP COLOR FLOW  1/17/2012         FEMUR FRACTURE SURGERY      TONSILLECTOMY         FAMILY HISTORY:  Family History   Problem Relation Age of Onset    Heart Disease Father        SOCIAL HISTORY:  Social History     Socioeconomic History    Marital status:    Tobacco Use    Smoking status: Former    Smokeless tobacco: Never   Substance and Sexual Activity    Alcohol use: Yes     Comment: 2 beers daily (pt also stated drinking a 6 pack/day)    Drug use: No       PHYSICAL EXAM:  Vitals:    11/27/22 1100 11/27/22 1200 11/27/22 1300 11/27/22 1400   BP: (!) 99/56 115/71 94/64 (!) 96/51   Pulse: 82 83 76 86   Resp: 17 25 19 22   Temp:  98.6 °F (37 °C)     TempSrc:  Oral     SpO2: 99% 99% 98%    Weight:       Height:           GENERAL:  He is alert and oriented x 3, communicates well, in no distress. NECK:  No masses, trachea is mid position. Supple, full ROM, no JVD or bruits. No palpable thyromegaly or lymphadenopathy. HEART:  Regular rate and rhythm. Normal S1 and S2. There are no abnormal murmurs. No heaves. LUNGS:  Clear to auscultation bilaterally. No use of accessory muscles. ABDOMEN:  Soft, non-tender. Normal bowel sounds. EXTREMITIES:  Full ROM x4. No bilateral lower extremity edema. Good distal pulses. EYES:  PERRL, normal lids & conjunctiva. No icterus. ENT: no external masses, no bleeding. Moist mucosa. Normal lips formation. NEURO: Full ROM x 4, EOMI, no tremors. SKIN:  Warm, dry and intact. Normal turgor, no petechia. Phych: alert & oriented x3. Normal  Judgement & insight. Currently not agitated or anxious.        EKG: Sinus rhythm, 80 bpm, left anterior fascicular block. Right bundle branch block. Poor R wave progression    Labs:  Recent Labs     11/26/22  1446 11/27/22  0425 11/27/22  1051   WBC 9.4 9.3 9.7   HGB 15.6 13.4 12.2*   HCT 42.3 36.2* 33.3*   MCV 85.6 86.8 86.9   * 97* 107*     Recent Labs     11/26/22  1446 11/26/22  1948 11/27/22  0053 11/27/22  0425 11/27/22  0925 11/27/22  1301   * 119*   < > 124* 129* 125*   K 3.2* 3.2*   < > 3.6 3.3* 3.4*   CL 78* 81*   < > 84* 89* 85*   CO2 25 22   < > 29 27 29   PHOS  --   --   --   --  3.1  --    BUN 9 11   < > 12 12 13   CREATININE 1.1 1.0   < > 1.0 0.9 0.9   MG 1.4* 1.9  --   --  2.0  --     < > = values in this interval not displayed. Recent Labs     11/27/22  1051   PROTIME 15.5*   INR 1.4     No results for input(s): CKTOTAL, CKMB, CKMBINDEX, TROPONINI in the last 72 hours. No results for input(s): BNP in the last 72 hours. No results for input(s): CHOL, HDL, TRIG in the last 72 hours. Invalid input(s): CHOLHDLRTorres 58     11/26/22  1446 11/26/22  1633   TROPHS 16* 16*       Assessment:   Falling. He denies any syncope. Alcohol abuse  Former smoker no chewing tobacco.  Diabetes. Hypertension. He is now on a fluid restriction and becoming hypotensive. Multiple severe electrolyte abnormalities including severe hyponatremia. Recommendations:  Electrolytes and volume per nephrology. Hold blood pressure medications. Echocardiogram.  Telemetry. Thank you for allowing me to participate in your patient's care. [address] [facility] [place], [address] [facility]  Interventional Cardiology    Note: This report was completed using computerized voice recognition software. Every effort has been made to ensure accuracy, however; and invert and computerized transcription errors may be present.

## 2022-11-28 ENCOUNTER — APPOINTMENT (OUTPATIENT)
Dept: GENERAL RADIOLOGY | Age: 57
DRG: 871 | End: 2022-11-28

## 2022-11-28 ENCOUNTER — APPOINTMENT (OUTPATIENT)
Dept: ULTRASOUND IMAGING | Age: 57
DRG: 871 | End: 2022-11-28

## 2022-11-28 LAB
ALBUMIN SERPL-MCNC: 3 G/DL (ref 3.5–5.2)
ALP BLD-CCNC: 79 U/L (ref 40–129)
ALT SERPL-CCNC: 59 U/L (ref 0–40)
ANION GAP SERPL CALCULATED.3IONS-SCNC: 10 MMOL/L (ref 7–16)
ANION GAP SERPL CALCULATED.3IONS-SCNC: 12 MMOL/L (ref 7–16)
ANION GAP SERPL CALCULATED.3IONS-SCNC: 16 MMOL/L (ref 7–16)
ANION GAP SERPL CALCULATED.3IONS-SCNC: 7 MMOL/L (ref 7–16)
ANION GAP SERPL CALCULATED.3IONS-SCNC: 8 MMOL/L (ref 7–16)
ANION GAP SERPL CALCULATED.3IONS-SCNC: 9 MMOL/L (ref 7–16)
AST SERPL-CCNC: 80 U/L (ref 0–39)
BASOPHILS ABSOLUTE: 0.01 E9/L (ref 0–0.2)
BASOPHILS RELATIVE PERCENT: 0.1 % (ref 0–2)
BILIRUB SERPL-MCNC: 0.7 MG/DL (ref 0–1.2)
BUN BLDV-MCNC: 11 MG/DL (ref 6–20)
BUN BLDV-MCNC: 12 MG/DL (ref 6–20)
BUN BLDV-MCNC: 12 MG/DL (ref 6–20)
BUN BLDV-MCNC: 13 MG/DL (ref 6–20)
BUN BLDV-MCNC: 14 MG/DL (ref 6–20)
BUN BLDV-MCNC: 15 MG/DL (ref 6–20)
CALCIUM SERPL-MCNC: 8.1 MG/DL (ref 8.6–10.2)
CALCIUM SERPL-MCNC: 8.4 MG/DL (ref 8.6–10.2)
CALCIUM SERPL-MCNC: 8.7 MG/DL (ref 8.6–10.2)
CALCIUM SERPL-MCNC: 8.9 MG/DL (ref 8.6–10.2)
CALCIUM SERPL-MCNC: 9 MG/DL (ref 8.6–10.2)
CALCIUM SERPL-MCNC: 9 MG/DL (ref 8.6–10.2)
CHLORIDE BLD-SCNC: 88 MMOL/L (ref 98–107)
CHLORIDE BLD-SCNC: 92 MMOL/L (ref 98–107)
CHLORIDE BLD-SCNC: 93 MMOL/L (ref 98–107)
CHLORIDE BLD-SCNC: 94 MMOL/L (ref 98–107)
CHLORIDE BLD-SCNC: 94 MMOL/L (ref 98–107)
CHLORIDE BLD-SCNC: 95 MMOL/L (ref 98–107)
CO2: 18 MMOL/L (ref 22–29)
CO2: 24 MMOL/L (ref 22–29)
CO2: 26 MMOL/L (ref 22–29)
CO2: 27 MMOL/L (ref 22–29)
CO2: 28 MMOL/L (ref 22–29)
CO2: 30 MMOL/L (ref 22–29)
CORTISOL TOTAL: 2.03 MCG/DL (ref 2.68–18.4)
CREAT SERPL-MCNC: 0.8 MG/DL (ref 0.7–1.2)
CREAT SERPL-MCNC: 0.9 MG/DL (ref 0.7–1.2)
CREAT SERPL-MCNC: 1 MG/DL (ref 0.7–1.2)
EKG ATRIAL RATE: 80 BPM
EKG ATRIAL RATE: 93 BPM
EKG P AXIS: 29 DEGREES
EKG P AXIS: 67 DEGREES
EKG P-R INTERVAL: 192 MS
EKG P-R INTERVAL: 218 MS
EKG Q-T INTERVAL: 414 MS
EKG Q-T INTERVAL: 442 MS
EKG QRS DURATION: 152 MS
EKG QRS DURATION: 156 MS
EKG QTC CALCULATION (BAZETT): 509 MS
EKG QTC CALCULATION (BAZETT): 514 MS
EKG R AXIS: -73 DEGREES
EKG R AXIS: -86 DEGREES
EKG T AXIS: 21 DEGREES
EKG T AXIS: 29 DEGREES
EKG VENTRICULAR RATE: 80 BPM
EKG VENTRICULAR RATE: 93 BPM
EOSINOPHILS ABSOLUTE: 0.02 E9/L (ref 0.05–0.5)
EOSINOPHILS RELATIVE PERCENT: 0.3 % (ref 0–6)
GFR SERPL CREATININE-BSD FRML MDRD: >60 ML/MIN/1.73
GLUCOSE BLD-MCNC: 122 MG/DL (ref 74–99)
GLUCOSE BLD-MCNC: 128 MG/DL (ref 74–99)
GLUCOSE BLD-MCNC: 140 MG/DL (ref 74–99)
GLUCOSE BLD-MCNC: 150 MG/DL (ref 74–99)
GLUCOSE BLD-MCNC: 151 MG/DL (ref 74–99)
GLUCOSE BLD-MCNC: 168 MG/DL (ref 74–99)
HCT VFR BLD CALC: 31.3 % (ref 37–54)
HEMOGLOBIN: 11.3 G/DL (ref 12.5–16.5)
IMMATURE GRANULOCYTES #: 0.02 E9/L
IMMATURE GRANULOCYTES %: 0.3 % (ref 0–5)
LYMPHOCYTES ABSOLUTE: 0.73 E9/L (ref 1.5–4)
LYMPHOCYTES RELATIVE PERCENT: 9.5 % (ref 20–42)
MCH RBC QN AUTO: 32.3 PG (ref 26–35)
MCHC RBC AUTO-ENTMCNC: 36.1 % (ref 32–34.5)
MCV RBC AUTO: 89.4 FL (ref 80–99.9)
METER GLUCOSE: 111 MG/DL (ref 74–99)
METER GLUCOSE: 142 MG/DL (ref 74–99)
METER GLUCOSE: 151 MG/DL (ref 74–99)
MONOCYTES ABSOLUTE: 0.52 E9/L (ref 0.1–0.95)
MONOCYTES RELATIVE PERCENT: 6.8 % (ref 2–12)
MRSA CULTURE ONLY: NORMAL
NEUTROPHILS ABSOLUTE: 6.35 E9/L (ref 1.8–7.3)
NEUTROPHILS RELATIVE PERCENT: 83 % (ref 43–80)
PATHOLOGIST REVIEW: NORMAL
PDW BLD-RTO: 12.3 FL (ref 11.5–15)
PLATELET # BLD: 91 E9/L (ref 130–450)
PLATELET CONFIRMATION: NORMAL
PMV BLD AUTO: 10 FL (ref 7–12)
POTASSIUM SERPL-SCNC: 3.5 MMOL/L (ref 3.5–5)
POTASSIUM SERPL-SCNC: 3.7 MMOL/L (ref 3.5–5)
POTASSIUM SERPL-SCNC: 4 MMOL/L (ref 3.5–5)
POTASSIUM SERPL-SCNC: 4.2 MMOL/L (ref 3.5–5)
POTASSIUM SERPL-SCNC: 4.3 MMOL/L (ref 3.5–5)
POTASSIUM SERPL-SCNC: 5.3 MMOL/L (ref 3.5–5)
RBC # BLD: 3.5 E12/L (ref 3.8–5.8)
SODIUM BLD-SCNC: 126 MMOL/L (ref 132–146)
SODIUM BLD-SCNC: 127 MMOL/L (ref 132–146)
SODIUM BLD-SCNC: 128 MMOL/L (ref 132–146)
SODIUM BLD-SCNC: 129 MMOL/L (ref 132–146)
SODIUM BLD-SCNC: 130 MMOL/L (ref 132–146)
SODIUM BLD-SCNC: 131 MMOL/L (ref 132–146)
SODIUM BLD-SCNC: 132 MMOL/L (ref 132–146)
TOTAL PROTEIN: 6.3 G/DL (ref 6.4–8.3)
URINE CULTURE, ROUTINE: NORMAL
WBC # BLD: 7.7 E9/L (ref 4.5–11.5)

## 2022-11-28 PROCEDURE — 6360000002 HC RX W HCPCS

## 2022-11-28 PROCEDURE — 2580000003 HC RX 258: Performed by: INTERNAL MEDICINE

## 2022-11-28 PROCEDURE — 6370000000 HC RX 637 (ALT 250 FOR IP)

## 2022-11-28 PROCEDURE — 6360000002 HC RX W HCPCS: Performed by: INTERNAL MEDICINE

## 2022-11-28 PROCEDURE — 82962 GLUCOSE BLOOD TEST: CPT

## 2022-11-28 PROCEDURE — 82533 TOTAL CORTISOL: CPT

## 2022-11-28 PROCEDURE — 71045 X-RAY EXAM CHEST 1 VIEW: CPT

## 2022-11-28 PROCEDURE — 2580000003 HC RX 258: Performed by: STUDENT IN AN ORGANIZED HEALTH CARE EDUCATION/TRAINING PROGRAM

## 2022-11-28 PROCEDURE — 85025 COMPLETE CBC W/AUTO DIFF WBC: CPT

## 2022-11-28 PROCEDURE — 6370000000 HC RX 637 (ALT 250 FOR IP): Performed by: INTERNAL MEDICINE

## 2022-11-28 PROCEDURE — 93880 EXTRACRANIAL BILAT STUDY: CPT

## 2022-11-28 PROCEDURE — 36415 COLL VENOUS BLD VENIPUNCTURE: CPT

## 2022-11-28 PROCEDURE — 6370000000 HC RX 637 (ALT 250 FOR IP): Performed by: STUDENT IN AN ORGANIZED HEALTH CARE EDUCATION/TRAINING PROGRAM

## 2022-11-28 PROCEDURE — 6360000002 HC RX W HCPCS: Performed by: EMERGENCY MEDICINE

## 2022-11-28 PROCEDURE — 2580000003 HC RX 258

## 2022-11-28 PROCEDURE — 2060000000 HC ICU INTERMEDIATE R&B

## 2022-11-28 PROCEDURE — 84295 ASSAY OF SERUM SODIUM: CPT

## 2022-11-28 PROCEDURE — 2580000003 HC RX 258: Performed by: EMERGENCY MEDICINE

## 2022-11-28 PROCEDURE — 80053 COMPREHEN METABOLIC PANEL: CPT

## 2022-11-28 PROCEDURE — 2700000000 HC OXYGEN THERAPY PER DAY

## 2022-11-28 PROCEDURE — 80048 BASIC METABOLIC PNL TOTAL CA: CPT

## 2022-11-28 RX ORDER — HEPARIN SODIUM 10000 [USP'U]/ML
5000 INJECTION, SOLUTION INTRAVENOUS; SUBCUTANEOUS EVERY 8 HOURS
Status: CANCELLED | OUTPATIENT
Start: 2022-11-28

## 2022-11-28 RX ORDER — POTASSIUM CHLORIDE 20 MEQ/1
40 TABLET, EXTENDED RELEASE ORAL ONCE
Status: COMPLETED | OUTPATIENT
Start: 2022-11-28 | End: 2022-11-28

## 2022-11-28 RX ADMIN — SODIUM CHLORIDE, PRESERVATIVE FREE 10 ML: 5 INJECTION INTRAVENOUS at 08:16

## 2022-11-28 RX ADMIN — ATORVASTATIN CALCIUM 40 MG: 40 TABLET, FILM COATED ORAL at 08:16

## 2022-11-28 RX ADMIN — THIAMINE HYDROCHLORIDE 500 MG: 100 INJECTION, SOLUTION INTRAMUSCULAR; INTRAVENOUS at 21:25

## 2022-11-28 RX ADMIN — Medication 10 ML: at 20:58

## 2022-11-28 RX ADMIN — AMPICILLIN SODIUM AND SULBACTAM SODIUM 3000 MG: 2; 1 INJECTION, POWDER, FOR SOLUTION INTRAMUSCULAR; INTRAVENOUS at 13:00

## 2022-11-28 RX ADMIN — THIAMINE HYDROCHLORIDE 500 MG: 100 INJECTION, SOLUTION INTRAMUSCULAR; INTRAVENOUS at 10:33

## 2022-11-28 RX ADMIN — Medication 1000 UNITS: at 08:17

## 2022-11-28 RX ADMIN — 0.12% CHLORHEXIDINE GLUCONATE 15 ML: 1.2 RINSE ORAL at 08:16

## 2022-11-28 RX ADMIN — ENOXAPARIN SODIUM 40 MG: 100 INJECTION SUBCUTANEOUS at 08:17

## 2022-11-28 RX ADMIN — AMPICILLIN SODIUM AND SULBACTAM SODIUM 3000 MG: 2; 1 INJECTION, POWDER, FOR SOLUTION INTRAMUSCULAR; INTRAVENOUS at 00:32

## 2022-11-28 RX ADMIN — AMPICILLIN SODIUM AND SULBACTAM SODIUM 3000 MG: 2; 1 INJECTION, POWDER, FOR SOLUTION INTRAMUSCULAR; INTRAVENOUS at 05:22

## 2022-11-28 RX ADMIN — FOLIC ACID 1 MG: 1 TABLET ORAL at 10:28

## 2022-11-28 RX ADMIN — THIAMINE HYDROCHLORIDE 500 MG: 100 INJECTION, SOLUTION INTRAMUSCULAR; INTRAVENOUS at 04:30

## 2022-11-28 RX ADMIN — Medication 10 ML: at 10:57

## 2022-11-28 RX ADMIN — POTASSIUM CHLORIDE 40 MEQ: 20 TABLET, EXTENDED RELEASE ORAL at 00:34

## 2022-11-28 RX ADMIN — AMPICILLIN SODIUM AND SULBACTAM SODIUM 3000 MG: 2; 1 INJECTION, POWDER, FOR SOLUTION INTRAMUSCULAR; INTRAVENOUS at 23:49

## 2022-11-28 RX ADMIN — Medication 10 ML: at 10:58

## 2022-11-28 RX ADMIN — SODIUM CHLORIDE, PRESERVATIVE FREE 10 ML: 5 INJECTION INTRAVENOUS at 05:23

## 2022-11-28 RX ADMIN — SODIUM CHLORIDE, PRESERVATIVE FREE 10 ML: 5 INJECTION INTRAVENOUS at 20:58

## 2022-11-28 RX ADMIN — MULTIVITAMIN TABLET 1 TABLET: TABLET at 08:17

## 2022-11-28 RX ADMIN — AMPICILLIN SODIUM AND SULBACTAM SODIUM 3000 MG: 2; 1 INJECTION, POWDER, FOR SOLUTION INTRAMUSCULAR; INTRAVENOUS at 18:33

## 2022-11-28 RX ADMIN — POTASSIUM CHLORIDE 40 MEQ: 20 TABLET, EXTENDED RELEASE ORAL at 10:28

## 2022-11-28 NOTE — PROGRESS NOTES
Department of Internal Medicine  Nephrology  Consult Note    Events reviewed. SUBJECTIVE: We are following Ilene Sanchez for hyponatremia. Reports no complaints.     PHYSICAL EXAM:      Vitals:    VITALS:  /76   Pulse 77   Temp 98.7 °F (37.1 °C) (Temporal)   Resp 16   Ht 6' (1.829 m)   Wt 199 lb (90.3 kg)   SpO2 94%   BMI 26.99 kg/m²   24HR INTAKE/OUTPUT:    Intake/Output Summary (Last 24 hours) at 11/28/2022 1014  Last data filed at 11/28/2022 4581  Gross per 24 hour   Intake 2018.41 ml   Output 2000 ml   Net 18.41 ml         Constitutional:  Alert, oriented, NAD  HEENT:  PERRLA, normocephalic  Respiratory:  CTA  Cardiovascular/Edema:  No edema, normal S1, S2, RRR  Gastrointestinal:  Soft, nondistended, nontender  Neurologic:  Nonfocal, oriented x 3  Skin:  No rashes, lesions, warm and dry    Scheduled Meds:   folic acid  1 mg Oral Daily    sodium chloride flush  5-40 mL IntraVENous 2 times per day    thiamine (VITAMIN B1) IVPB  500 mg IntraVENous Q8H    Followed by    Sepideh Natarajan ON 11/29/2022] thiamine (VITAMIN B1) IVPB  250 mg IntraVENous Q24H    Followed by    Sepideh Natarajan ON 12/4/2022] thiamine  100 mg Oral Daily    chlorhexidine  15 mL Mouth/Throat BID    sodium chloride flush  5-40 mL IntraVENous 2 times per day    multivitamin  1 tablet Oral Daily    sodium chloride flush  5-40 mL IntraVENous 2 times per day    sodium chloride flush  5-40 mL IntraVENous 2 times per day    enoxaparin  40 mg SubCUTAneous Daily    ampicillin-sulbactam  3,000 mg IntraVENous Q6H    sodium chloride flush  5-40 mL IntraVENous 2 times per day    [Held by provider] amLODIPine  10 mg Oral QAM    atorvastatin  40 mg Oral QAM    Vitamin D  1,000 Units Oral QAM    [Held by provider] metoprolol succinate  50 mg Oral QAM    insulin lispro  0-4 Units SubCUTAneous TID     insulin lispro  0-4 Units SubCUTAneous Nightly     Continuous Infusions:   sodium chloride      dextrose 75 mL/hr at 11/27/22 8928    sodium chloride dextrose       PRN Meds:.sodium chloride flush, sodium chloride, trimethobenzamide, perflutren lipid microspheres, sodium chloride flush, LORazepam **OR** LORazepam **OR** LORazepam **OR** LORazepam **OR** LORazepam **OR** LORazepam **OR** LORazepam **OR** LORazepam, sodium chloride flush, sodium chloride flush, sodium chloride flush, sodium chloride, polyethylene glycol, acetaminophen **OR** acetaminophen, glucose, dextrose bolus **OR** dextrose bolus, glucagon (rDNA), dextrose      DATA:    CBC with Differential:    Lab Results   Component Value Date/Time    WBC 7.7 11/28/2022 04:38 AM    RBC 3.50 11/28/2022 04:38 AM    HGB 11.3 11/28/2022 04:38 AM    HCT 31.3 11/28/2022 04:38 AM    PLT 91 11/28/2022 04:38 AM    MCV 89.4 11/28/2022 04:38 AM    MCH 32.3 11/28/2022 04:38 AM    MCHC 36.1 11/28/2022 04:38 AM    RDW 12.3 11/28/2022 04:38 AM    LYMPHOPCT 9.5 11/28/2022 04:38 AM    MONOPCT 6.8 11/28/2022 04:38 AM    BASOPCT 0.1 11/28/2022 04:38 AM    MONOSABS 0.52 11/28/2022 04:38 AM    LYMPHSABS 0.73 11/28/2022 04:38 AM    EOSABS 0.02 11/28/2022 04:38 AM    BASOSABS 0.01 11/28/2022 04:38 AM     CMP:    Lab Results   Component Value Date/Time     11/28/2022 08:00 AM    K 3.7 11/28/2022 08:00 AM    K 3.2 11/26/2022 07:48 PM    CL 92 11/28/2022 08:00 AM    CO2 27 11/28/2022 08:00 AM    BUN 12 11/28/2022 08:00 AM    CREATININE 0.8 11/28/2022 08:00 AM    GFRAA >60 10/14/2014 06:15 PM    LABGLOM >60 11/28/2022 08:00 AM    GLUCOSE 150 11/28/2022 08:00 AM    GLUCOSE 91 01/17/2012 01:55 AM    PROT 6.3 11/28/2022 04:38 AM    LABALBU 3.0 11/28/2022 04:38 AM    LABALBU 3.6 01/17/2012 01:55 AM    CALCIUM 8.7 11/28/2022 08:00 AM    BILITOT 0.7 11/28/2022 04:38 AM    ALKPHOS 79 11/28/2022 04:38 AM    AST 80 11/28/2022 04:38 AM    ALT 59 11/28/2022 04:38 AM     Magnesium:    Lab Results   Component Value Date/Time    MG 2.0 11/27/2022 09:25 AM     Phosphorus:    Lab Results   Component Value Date/Time    PHOS 3.1 11/27/2022 09:25 AM     Radiology Review:    CXR 11/27/22   No acute process. CT Abd/pelvis 11/26/22   No evidence of acute abdominal/pelvic pathology is seen. Airspace opacification visualized in the lung fields would recommend clinical   correlation for COVID-19 disease or pneumonia. CTA chest 11/26/22   1. There is no pulmonary embolus       2. Multifocal bilateral ground-glass pneumonia more prominent within the   left upper lobe and left lower lobe. 3.  5 mm pleural based nodule seen within the left lower lobe on axial image   number 124           BRIEF SUMMARY OF INITIAL CONSULT:    Fely Valle is a 73-year-old male, past medical history significant for HTN, DM type II, EtOH abuse and tobacco abuse (chewing) who presented to the ED on 11/26/22 complaints of syncopal episodes s/p fall. Initial lab work significant for sodium level 123, reason for this consultation. Patient reports two recent episodes of vomiting with minimal output, denies frequent urination, denies diarrhea, endorses high fluid intake. Medications include hydrochlorothiazide, metformin, lisinopril, denies use of naproxen. Problems resolved:  HAGMA 2/2 lactic acidosis/ketoacidosis (starvation/alcoholic) and metabolic alkalosis 2/2 vomiting  Hypokalemia 2/2 GI losses and diuretics, replaced    IMPRESSION/RECOMMENDATIONS:        Hypotonic hyponatremia,multifactorial, with component of hemodynamically mediated due to intravascular volume depletion 2/2 vomiting in the setting of thiazide diuretic administration. Serum Cl- (corrected) 90 on admission, Alvarez 23, UCl<20, UOsm 289, .Alvarez+K:Serum Na: 0.7. Sodium levels improved and adequate rate, up to 128. We will discontinue D5W.     HTN, on metoprolol  ---------------------------------------------------------  Type II DM  Mild transaminitis, alcohol induced  CAD, on atorvastatin  ETOH abuse  Pneumonia, CAP vs aspiration, on ampicillin-sulbactam    Plan:    Discontinue D5W  Continue to monitor sodium levels BMP q4h x5  Call me if UO > 100 cc/hr  Continue fluid restriction,1 L      Electronically signed by Guerda Myrick MD on 11/28/2022 at 10:14 AM

## 2022-11-28 NOTE — ASSESSMENT & PLAN NOTE
Uncontrolled, continue current treatment plan. It is relatively complex situation. Patient has hyponatremia from excess beer drinking. Fluid restriction is one approach. But at the same time patient is suspected to be in DT. We will watch Na closely and may give D5W intermittently.

## 2022-11-28 NOTE — PROGRESS NOTES
[address] [facility]  Department of Internal Medicine   Internal Medicine Residency   MICU Progress Note    Patient:  Janine Hensley 59 y.o. male  MRN: [id number]     Date of Service: 11/28/2022    Allergy: Patient has no known allergies. Subjective     Patient was seen and examined at the bedside in no acute distress. He asked when he can be discharged, explained that as soon as we received subsequent labs and parameters including clinical status remain stable. 24h change: Sodium level continues to improve, negative infectious work-up so far. BP currently soft, holding home BP meds. ROS: Denies Fever/chills/CP/SOB/N/V/D/C/Dysuria/Blood in stool or urine  Objective     VS: /69   Pulse (!) 101   Temp 98.7 °F (37.1 °C) (Temporal)   Resp 19   Ht 6' (1.829 m)   Wt 199 lb (90.3 kg)   SpO2 97%   BMI 26.99 kg/m²           I & O - 24hr:   Intake/Output Summary (Last 24 hours) at 11/28/2022 1737  Last data filed at 11/28/2022 0817  Gross per 24 hour   Intake 978.41 ml   Output 1400 ml   Net -421.59 ml       Physical Exam:  General Appearance: alert, appears stated age, and cooperative  Neck: supple, symmetrical, trachea midline  Lung: clear to auscultation bilaterally  Heart: regular rate and rhythm, S1, S2 normal, no murmur, click, rub or gallop  Abdomen: soft, non-tender; bowel sounds normal; no masses,  no organomegaly  Extremities:  extremities normal, atraumatic, no cyanosis or edema  Musculoskeletal: No joint swelling, no muscle tenderness. ROM normal in all joints of extremities.    Neurologic: Mental status: Alert, oriented, thought content appropriate    Lines     site day    Art line   None    TLC None    PICC None    Hemoaccess None       ABG:     Lab Results   Component Value Date/Time    PH 7.520 11/26/2022 02:46 PM    PCO2 29.2 11/26/2022 02:46 PM    PO2 61.8 11/26/2022 02:46 PM    HCO3 23.3 11/26/2022 02:46 PM    BE 1.6 11/26/2022 02:46 PM    THB 15.3 11/26/2022 02:46 PM    O2SAT 92.3 11/26/2022 02:46 PM        Medications     Nutrition:   Diet    ATB:   Antibiotics  Days   Unasyn 2             Labs   CBC with Differential:    Lab Results   Component Value Date/Time    WBC 7.7 11/28/2022 04:38 AM    RBC 3.50 11/28/2022 04:38 AM    HGB 11.3 11/28/2022 04:38 AM    HCT 31.3 11/28/2022 04:38 AM    PLT 91 11/28/2022 04:38 AM    MCV 89.4 11/28/2022 04:38 AM    MCH 32.3 11/28/2022 04:38 AM    MCHC 36.1 11/28/2022 04:38 AM    RDW 12.3 11/28/2022 04:38 AM    LYMPHOPCT 9.5 11/28/2022 04:38 AM    MONOPCT 6.8 11/28/2022 04:38 AM    BASOPCT 0.1 11/28/2022 04:38 AM    MONOSABS 0.52 11/28/2022 04:38 AM    LYMPHSABS 0.73 11/28/2022 04:38 AM    EOSABS 0.02 11/28/2022 04:38 AM    BASOSABS 0.01 11/28/2022 04:38 AM     BMP:    Lab Results   Component Value Date/Time     11/28/2022 01:12 PM    K 4.2 11/28/2022 12:40 PM    K 3.2 11/26/2022 07:48 PM    CL 95 11/28/2022 12:40 PM    CO2 24 11/28/2022 12:40 PM    BUN 11 11/28/2022 12:40 PM    LABALBU 3.0 11/28/2022 04:38 AM    LABALBU 3.6 01/17/2012 01:55 AM    CREATININE 0.8 11/28/2022 12:40 PM    CALCIUM 9.0 11/28/2022 12:40 PM    GFRAA >60 10/14/2014 06:15 PM    LABGLOM >60 11/28/2022 12:40 PM    GLUCOSE 128 11/28/2022 12:40 PM    GLUCOSE 91 01/17/2012 01:55 AM     Sodium:    Lab Results   Component Value Date/Time     11/28/2022 01:12 PM     Resident's Assessment and Plan     Neuro  Possible Seizures  Neurology consulted per primary team  No concern for seizure  Neurology signed off     Cardio  R/o syncope   Cardiology consulted per primary team   Carotid bilateral U/S  Follow pending Echo    Follow orthostatics      Hx of HTN   BP soft, 96/62  Continue to hold  home Lopressor and Norvasc  Continue to hold Lisinopril/HCTZ     Hx HFpEF (Stage 1 DD)  Last Echo 65% with Stage one diastolic dysfunction - 6235  Follow pending ECHO     Pulmonary  AHRF 2/2 CAP vs aspiration PNA   CT chest show bilateral ground glass opacities, significant in left upper and lower lobe  Follow pending MRSA nares  complete Unasyn 3 g q6 x 5 days     Pleural Nodule  5 mm pleural based nodule  Not documented on previous imaging  Recommend OP follow-up      Renal  Hypotonic Euvolemic Hyponatremia 2/2 poor oral intake  Unlikely beer potomania given urine osm >100  Discontinue D5W  Continue to monitor sodium levels BMP q4h x4  Continue fluid restriction,1 L    Endocrine  Hx DM  Per patient was dx 3 years ago  HbA1c 5.8  Hx LE paraesthesia 2/2 DM   Continue LDSS  Hypoglycemia protocol  Follow BG AC/HS      Infection  CAP vs Aspiration PNA  CT chest findings show Multifocal bilateral ground-glass pneumonia more prominent within the left upper lobe and left lower lobe  Negative cultures results so far  Complete Unasyn dose 12/02/22     Heme/Onc  Thrombocytopenia, likely reactive in setting of infection   Low HIT score, normal INR  Monitor plts       Psych  Hx Alcohol abuse  Reported last drink was 4 days ago  Reports drinking a 6 pack daily   Lipase levels on admission 96  Continue on CIWA protocol  Continue thiamine and folic acid      Hx Tobacco use (chewing tobacco)      PT/OT evaluation: not onboard   DVT prophylaxis/ GI prophylaxis: Lovenox 40 mg daily / diet  Disposition: Continue MICU care    Diego Hairston MD, MPH PGY-1    Attending physician: Dr. Héctor Pete [address] [place] [address]  Department of Pulmonary, Critical Care and Sleep Medicine  [address] [facility]  Department of Internal Medicine      During multidisciplinary team rounds Mihai Sheikh is a 59 y.o. male was seen, examined and discussed. This is confirmation that I have personally seen and examined the patient and that the key elements of the encounter were performed by me (> 85 % time). The medications & laboratory data was discussed and adjusted where necessary.  The radiographic images were reviewed or with radiologist or consultant if felt dis-concordant with the exam or history. The above findings were corroborated, plans confirmed and changes made if needed. Family is updated at the bedside as available. Key issues of the case were discussed among consultants. Critical Care time is documented if appropriate.       Jerrod Reyes DO, FACP, FCCP, [place],

## 2022-11-28 NOTE — CONSULTS
[facility] [address]  Neurology Consult    Date:  11/27/2022  Patient Name:  Janine Hensley  YOB: 1965  MRN: [id number]     PCP:  Matt Schaffer DO   Referring:  No ref. provider found      Chief Complaint: passed out    History obtained from: patient, chart, staff    Marielena Helm is a 59 y.o. male with a history of alcohol use disorder admitted with hyponatremia and suspected pneumonia. His description of events is not particularly suspicious for seizures at this time. More likely they are related to his other active medical issues resulting in syncope. Plan  If events continue in absence of other metabolic/infectious processes further neurologic evaluation could be considered. Neurology service will sign off at this time. Please contact our service with any further questions or concerns. History of Present Illness:  Janine Hensley is a 59 y.o. right handed male presenting for evaluation of episodes of loss of consciousness. He states that over the last 3 days he has fallen twice. He did not have any preceding light headedness, dizziness, or other symptoms to suggest an epileptic aura. He states he woke up as soon as he hit the ground and thinks he wasn't unconscious more than a couple seconds. He did not have any subsequent confusion. He has only noted these events when standing. He has not been told of any staring spells nor does he describe any episodes of loss of memory. No history of concussions or CNS infections in the past. He drinks a 6 pack (some notes indicate 12 pack) of beer daily.      No history of seizures or episodes of withdrawal with stopping alcohol in the past.     Medical History:   Past Medical History:   Diagnosis Date    Diabetes ([address]     Hypertension         Surgical History:   Past Surgical History:   Procedure Laterality Date    ECHO COMPL W DOP COLOR FLOW  1/17/2012         FEMUR FRACTURE SURGERY      TONSILLECTOMY Family History:   Family History   Problem Relation Age of Onset    Heart Disease Father        Social History:  Social History     Tobacco Use    Smoking status: Former    Smokeless tobacco: Never   Substance Use Topics    Alcohol use: Yes     Comment: 2 beers daily (pt also stated drinking a 6 pack/day)    Drug use: No        Current Medications:      Current Facility-Administered Medications   Medication Dose Route Frequency Provider Last Rate Last Admin    folic acid (FOLVITE) tablet 1 mg  1 mg Oral Daily Samantha Kumar MD   1 mg at 11/27/22 0919    sodium chloride flush 0.9 % injection 5-40 mL  5-40 mL IntraVENous 2 times per day Pearly Carrow, DO   10 mL at 11/27/22 2127    sodium chloride flush 0.9 % injection 5-40 mL  5-40 mL IntraVENous PRN Cuca L Hdez, DO        0.9 % sodium chloride infusion   IntraVENous PRN Cuca L Hdez, DO        thiamine (B-1) 500 mg in sodium chloride 0.9 % 100 mL IVPB  500 mg IntraVENous Q8H Cuca L Hdez,  mL/hr at 11/27/22 2050 500 mg at 11/27/22 2050    Followed by    Michelle Frausto ON 11/29/2022] thiamine (B-1) 250 mg in sodium chloride 0.9 % 100 mL IVPB  250 mg IntraVENous Q24H Cuca L Hdez, DO        Followed by    Michelle Frausto ON 12/4/2022] thiamine tablet 100 mg  100 mg Oral Daily Cuca L Hdez, DO        trimethobenzamide (TIGAN) injection 200 mg  200 mg IntraMUSCular Q6H PRN Cuca L Hdez, DO        chlorhexidine (PERIDEX) 0.12 % solution 15 mL  15 mL Mouth/Throat BID Cuca L Hdez, DO        perflutren lipid microspheres (DEFINITY) injection 1.5 mL  1.5 mL IntraVENous ONCE PRN Sylvia Pratt, DO        dextrose 5 % solution   IntraVENous Continuous Israel Vu MD 75 mL/hr at 11/27/22 2046 New Bag at 11/27/22 2046    sodium chloride flush 0.9 % injection 5-40 mL  5-40 mL IntraVENous 2 times per day Corbinharris Charleso, DO        sodium chloride flush 0.9 % injection 5-40 mL  5-40 mL IntraVENous PRN Corbin Charleso, DO        multivitamin 1 tablet  1 tablet Oral Daily Rip Bone, DO   1 tablet at 11/27/22 0919    LORazepam (ATIVAN) tablet 1 mg  1 mg Oral Q1H PRN Rip Bone, DO        Or    LORazepam (ATIVAN) injection 1 mg  1 mg IntraVENous Q1H PRN Rip Bone, DO        Or    LORazepam (ATIVAN) tablet 2 mg  2 mg Oral Q1H PRN Rip Bone, DO        Or    LORazepam (ATIVAN) injection 2 mg  2 mg IntraVENous Q1H PRN Rip Bone, DO        Or    LORazepam (ATIVAN) tablet 3 mg  3 mg Oral Q1H PRN Rip Bone, DO        Or    LORazepam (ATIVAN) injection 3 mg  3 mg IntraVENous Q1H PRN Rip Bone, DO        Or    LORazepam (ATIVAN) tablet 4 mg  4 mg Oral Q1H PRN Rip Bone, DO        Or    LORazepam (ATIVAN) injection 4 mg  4 mg IntraVENous Q1H PRN Rip Bone, DO        sodium chloride flush 0.9 % injection 5-40 mL  5-40 mL IntraVENous 2 times per day Destiny Glass MD        sodium chloride flush 0.9 % injection 5-40 mL  5-40 mL IntraVENous PRN Destiny Glass MD        sodium chloride flush 0.9 % injection 5-40 mL  5-40 mL IntraVENous 2 times per day Destiny Glass MD   10 mL at 11/26/22 2146    sodium chloride flush 0.9 % injection 5-40 mL  5-40 mL IntraVENous PRN Destiny Glass MD        enoxaparin (LOVENOX) injection 40 mg  40 mg SubCUTAneous Daily Destiny Glass MD   40 mg at 11/27/22 0921    ampicillin-sulbactam (UNASYN) 3,000 mg in sodium chloride 0.9 % 100 mL IVPB ([id number])  3,000 mg IntraVENous Q6H [facility] DELFINO WESTON [facility] GEOVANNY Barcenas MD   Stopped at 11/27/22 2006    sodium chloride flush 0.9 % injection 5-40 mL  5-40 mL IntraVENous 2 times per day [facility] SATHISH Barcenas MD        sodium chloride flush 0.9 % injection 5-40 mL  5-40 mL IntraVENous PRN [facility] ENRICO [facility] GEOVANNY Barcenas MD        0.9 % sodium chloride infusion   IntraVENous PRN [facility] Cuco Barcenas MD        polyethylene glycol [facility]) packet 17 g 17 g Oral Daily PRN [facility] Lashae Jiang MD        acetaminophen (TYLENOL) tablet 650 mg  650 mg Oral Q6H [facility] Lashae Jiang MD        Or    acetaminophen (TYLENOL) suppository 650 mg  650 mg Rectal Q6H PRN [facility] Lashae Jiang MD        amLODIPine (NORVASC) tablet 10 mg  10 mg Oral QA [facility]   10 mg at 11/27/22 0919    atorvastatin (LIPITOR) tablet 40 mg  40 mg Oral [facility] Lashae Jiang MD   40 mg at 11/27/22 0919    Vitamin D (CHOLECALCIFEROL) tablet 1,000 Units  1,000 Units Oral [facility] Lashae Jiang MD   1,000 Units at 11/27/22 0919    metoprolol succinate (TOPROL XL) extended release tablet 50 mg  50 mg Oral QA Robby Makua, DO   50 mg at 11/27/22 0919    insulin lispro (HUMALOG) injection vial 0-4 Units  0-4 Units SubCUTAneous TID [facility] Lashae Jiang MD        insulin lispro (HUMALOG) injection vial 0-4 Units  0-4 Units SubCUTAneous [facility] Lashae Jiang MD        glucose chewable tablet 16 g  4 tablet Oral PRN [facility] Lashae Jiang MD        dextrose bolus 10% 125 mL  125 mL IntraVENous PRN [facility] Lashae Jiang MD        Or    dextrose bolus 10% 250 mL  250 mL IntraVENous PRN [facility] Lashae Jiang MD        glucagon (rDNA) injection 1 mg  1 mg SubCUTAneous PRN [facility] Lashae Jiang MD        dextrose 10 % infusion   IntraVENous Continuous PRN [facility] Lashae Jiang MD            Allergies:      No Known Allergies     Physical Examination  Vitals   Vitals:    11/27/22 1700 11/27/22 1800 11/27/22 2000 11/27/22 2100   BP: 102/62 109/67  111/68   Pulse: 80 80     Resp: 21 20     Temp:   97.9 °F (36.6 °C)    TempSrc:   Axillary    SpO2: 98% 98%     Weight:       Height:            General: Patient appears in no acute distress.    HEENT: Normocephalic, atraumatic  Chest: Clear to auscultation bilaterally  Heart: No murmurs appreciated  Extremities/Peripheral vascular: No edema/swelling noted. No cold limbs noted. Neurologic Examination    Mental Status  Alert, and oriented to person, place and time. Speech is fluent with intact comprehension. No evidence of memory impairment. Attention and concentration appeared normal.     Cranial Nerves  II. Visual fields full to confrontation bilaterally. Fundoscopic exam: Discs not well visualized. III, IV, VI: Pupils equally round and reactive to light, 3 to 2 mm bilaterally. EOMs: full, no nystagmus. V. Facial sensation intact to light touch bilaterally  VII: Facial movements symmetric and strong  VIII: Hearing intact to voice  IX,X: Palate elevates symmetrically.  No dysarthria  XI: Sternocleidomastoid and trapezius 5/5 bilaterally   XII: Tongue is midline    Motor     Right Left   Right Left   Deltoid 5 5  Hip Flexion 5 5   Biceps 5 5  Knee Extension 5 5   Triceps 5 5  Knee Flexion 5 5   Handgrip 5 5  Ankle Dorsiflexion 5 5       Ankle Plantarflexion 5 5     Tone: Normal in all four limbs    Bulk: Normal in all four limbs with no evidence of atrophy    Pronator drift: absent bilaterally    Sensation  Light Touch: Intact distally in all four limbs  Vibration: Intact distally in all four limbs    Reflexes     Right Left   Biceps 2 2   Brachioradialis 2 2   Patellar 2 2   Achilles 1 1   ankle clonus none none   Babinski absent absent     Coordination  Mild intention and postural tremor noted in BL UEs    Gait    Deferred for safety/fall consideration      Labs  Recent Labs     11/26/22  1446 11/26/22  1446 11/26/22  1948 11/27/22  0053 11/27/22  0425 11/27/22  0925 11/27/22  1051 11/27/22  1301 11/27/22  1646   *  --  119*   < > 124*   < >  --    < > 127*   K 3.2*  --  3.2*   < > 3.6   < >  --    < > 3.9   CL 78*  --  81*   < > 84*   < >  --    < > 87*   CO2 25  --  22   < > 29   < >  --    < > 30*   BUN 9  --  11   < > 12   < >  --    < > 14   CREATININE 1.1  --  1.0   < > 1.0   < >  --    < > 1.0   GLUCOSE 175*  --  222*   < > 143*   < >  --    < > 159*   CALCIUM 9.3  --  8.2*   < > 9.0   < >  --    < > 8.9   PROT 8.7*  --   --   --   --   --   --   --   --    LABALBU 4.0  --   --   --   --   --   --   --   --    BILITOT 1.6*  --   --   --   --   --   --   --   --    ALKPHOS 138*  --   --   --   --   --   --   --   --    AST 83*  --   --   --   --   --   --   --   --    ALT 77*  --   --   --   --   --   --   --   --    WBC 9.4  --   --   --  9.3  --  9.7  --   --    RBC 4.94  --   --   --  4.17  --  3.83  --   --    HGB 15.6  --   --   --  13.4  --  12.2*  --   --    HCT 42.3  --   --   --  36.2*  --  33.3*  --   --    MCV 85.6  --   --   --  86.8  --  86.9  --   --    MCH 31.6  --   --   --  32.1  --  31.9  --   --    MCHC 36.9*  --   --   --  37.0*  --  36.6*  --   --    RDW 11.9  --   --   --  12.1  --  12.2  --   --    *  --   --   --  97*  --  107*  --   --    MPV 9.4  --   --   --  10.0  --  10.0  --   --    PH  --  7.520*  --   --   --   --   --   --   --    PO2  --  61.8*  --   --   --   --   --   --   --    PCO2  --  29.2*  --   --   --   --   --   --   --    HCO3  --  23.3  --   --   --   --   --   --   --    BE  --  1.6  --   --   --   --   --   --   --    O2SAT  --  92.3  --   --   --   --   --   --   --    LACTA 4.5*  --  1.9  --   --   --   --   --   --    LABA1C  --   --   --   --  5.8*  --   --   --   --     < > = values in this interval not displayed. Imaging  XR CHEST PORTABLE   Final Result   No acute process. CT HEAD WO CONTRAST   Final Result   No acute intracranial abnormality. CTA PULMONARY W CONTRAST   Final Result   1. There is no pulmonary embolus      2. Multifocal bilateral ground-glass pneumonia more prominent within the   left upper lobe and left lower lobe.       3.  5 mm pleural based nodule seen within the left lower lobe on axial image   number 124         CT ABDOMEN PELVIS W IV CONTRAST Additional Contrast? None Final Result   No evidence of acute abdominal/pelvic pathology is seen. Airspace opacification visualized in the lung fields would recommend clinical   correlation for COVID-19 disease or pneumonia.          US CAROTID ARTERY BILATERAL    (Results Pending)   XR CHEST PORTABLE    (Results Pending)             Electronically signed by Jones Puri DO on 11/27/2022 at 9:53 PM

## 2022-11-28 NOTE — PROGRESS NOTES
[address] [facility]  Department of Internal Medicine   Internal Medicine Residency   MICU Progress Note    Patient:  Stefan Yanez 59 y.o. male  MRN: [id number]     Date of Service: 11/28/2022    Allergy: Patient has no known allergies. Subjective     Patient was observed resting comfortably in bed. Denies fever, chills, CP, SOB, dizziness, N/V. Has  not had a bowel movement. 24h change: Steady increase in Na to 131, stable vitals, no acute events. AM Cortisol low 2.3, (-) blood cx at 24 hours, negative legionella and strep pneumonia, Urine <10,000 mixed gram +. Objective     VS: /69   Pulse (!) 101   Temp 98.7 °F (37.1 °C) (Temporal)   Resp 19   Ht 6' (1.829 m)   Wt 199 lb (90.3 kg)   SpO2 97%   BMI 26.99 kg/m²           I & O - 24hr:   Intake/Output Summary (Last 24 hours) at 11/28/2022 1647  Last data filed at 11/28/2022 0817  Gross per 24 hour   Intake 978.41 ml   Output 1400 ml   Net -421.59 ml       Physical Exam:  General Appearance: alert, appears stated age, cooperative, and no distress  Neck: no JVD and supple, symmetrical, trachea midline  Lung:  Mild left lower lung crackles, normal respiratory effort on RA  Heart: regular rate and rhythm, S1, S2 normal, no murmur, click, rub or gallop  Abdomen: soft, non-tender; bowel sounds normal; no masses,  no organomegaly  Extremities:  extremities normal, atraumatic, no cyanosis or edema, no edema, redness or tenderness in the calves or thighs, and no ulcers, gangrene or trophic changes  Musculoskeletal: No joint swelling, no muscle tenderness. ROM normal in all joints of extremities. Neurologic: Mental status: Alert, oriented, thought content appropriate    Lines     site day    Art line   None    TLC None    PICC None    Hemoaccess None    Oxygen:     On RA  Mechanical Ventilation:  No  ABG:     Lab Results   Component Value Date/Time    PH 7.520 11/26/2022 02:46 PM    PCO2 29.2 11/26/2022 02:46 PM    PO2 61.8 11/26/2022 02:46 PM    HCO3 23.3 11/26/2022 02:46 PM    BE 1.6 11/26/2022 02:46 PM    THB 15.3 11/26/2022 02:46 PM    O2SAT 92.3 11/26/2022 02:46 PM        Medications     Infusions: (Fluid, Sedation, Vasopressors)  IVF:   none  Vasopressors  none  Sedation  none    Nutrition:   Diet  ATB:   Antibiotics  Days   Unasyn 11/27             Skin issues: none  Patient currently has   DVT prophylaxis  Labs   CBC with Differential:    Lab Results   Component Value Date/Time    WBC 7.7 11/28/2022 04:38 AM    RBC 3.50 11/28/2022 04:38 AM    HGB 11.3 11/28/2022 04:38 AM    HCT 31.3 11/28/2022 04:38 AM    PLT 91 11/28/2022 04:38 AM    MCV 89.4 11/28/2022 04:38 AM    MCH 32.3 11/28/2022 04:38 AM    MCHC 36.1 11/28/2022 04:38 AM    RDW 12.3 11/28/2022 04:38 AM    LYMPHOPCT 9.5 11/28/2022 04:38 AM    MONOPCT 6.8 11/28/2022 04:38 AM    BASOPCT 0.1 11/28/2022 04:38 AM    MONOSABS 0.52 11/28/2022 04:38 AM    LYMPHSABS 0.73 11/28/2022 04:38 AM    EOSABS 0.02 11/28/2022 04:38 AM    BASOSABS 0.01 11/28/2022 04:38 AM     CMP:    Lab Results   Component Value Date/Time     11/28/2022 01:12 PM    K 4.2 11/28/2022 12:40 PM    K 3.2 11/26/2022 07:48 PM    CL 95 11/28/2022 12:40 PM    CO2 24 11/28/2022 12:40 PM    BUN 11 11/28/2022 12:40 PM    CREATININE 0.8 11/28/2022 12:40 PM    GFRAA >60 10/14/2014 06:15 PM    LABGLOM >60 11/28/2022 12:40 PM    GLUCOSE 128 11/28/2022 12:40 PM    GLUCOSE 91 01/17/2012 01:55 AM    PROT 6.3 11/28/2022 04:38 AM    LABALBU 3.0 11/28/2022 04:38 AM    LABALBU 3.6 01/17/2012 01:55 AM    CALCIUM 9.0 11/28/2022 12:40 PM    BILITOT 0.7 11/28/2022 04:38 AM    ALKPHOS 79 11/28/2022 04:38 AM    AST 80 11/28/2022 04:38 AM    ALT 59 11/28/2022 04:38 AM     Calcium:    Lab Results   Component Value Date/Time    CALCIUM 9.0 11/28/2022 12:40 PM     U/A:    Lab Results   Component Value Date/Time    COLORU Yellow 11/26/2022 02:46 PM    PROTEINU Negative 11/26/2022 02:46 PM    PHUR 6.5 11/26/2022 02:46 PM    WBCUA NONE 11/26/2022 02:46 PM    RBCUA 0-1 11/26/2022 02:46 PM    BACTERIA RARE 11/26/2022 02:46 PM    CLARITYU Clear 11/26/2022 02:46 PM    SPECGRAV 1.015 11/26/2022 02:46 PM    LEUKOCYTESUR Negative 11/26/2022 02:46 PM    UROBILINOGEN 4.0 11/26/2022 02:46 PM    BILIRUBINUR Negative 11/26/2022 02:46 PM    BLOODU Negative 11/26/2022 02:46 PM    GLUCOSEU Negative 11/26/2022 02:46 PM     ABG:    Lab Results   Component Value Date/Time    PH 7.520 11/26/2022 02:46 PM    PCO2 29.2 11/26/2022 02:46 PM    PO2 61.8 11/26/2022 02:46 PM    HCO3 23.3 11/26/2022 02:46 PM    BE 1.6 11/26/2022 02:46 PM    O2SAT 92.3 11/26/2022 02:46 PM     HgBA1c:    Lab Results   Component Value Date/Time    LABA1C 5.8 11/27/2022 04:25 AM     TSH:    Lab Results   Component Value Date/Time    TSH 0.577 11/27/2022 04:25 AM       Imaging Studies:  CXR: Developing L lower lobe opacity  CT scan: There is no pulmonary embolus       2. Multifocal bilateral ground-glass pneumonia more prominent within the   left upper lobe and left lower lobe.        3.  5 mm pleural based nodule seen within the left lower lobe on axial image   number 124       Resident's Assessment and Plan     Neuro     R/o Seizures  Neurology evaluated, does not believe episodes are seizures  Patient Aox3 at neurologic baseline     Cardio     R/o syncope   Cardiology consulted per primary  Carotid bilateral U/S  Echo pending  Follow orthostatics      Hx of HTN   Hold home medications     Hx HFpEF (Stage 1 DD)  Last Echo 65% with Stage one diastolic dysfunction - 0485  Repeat echo pending    Hx HLD  -Lipitor 40mg     Pulmonary     AHRF 2/2 CAP vs aspiration PNA   CT chest show bilateral ground glass opacities, significant in left upper and lower lobe  CXR, L lower lobe developing infiltrate  Blood cx (-) at 24 hours  On Unasyn 3 g q6 x 5 days (started 11/27)     Pleural Nodule  5 mm pleural based nodule  Not documented on previous imaging  Will f/u as OP     GI        Renal     Hypotonic Euvolemic Hyponatremia, r/o SIADH 2/2 Pneumonia   TSH wnl, Cortisol am low 2.03  Follow nephrology recommendations   BMP q4hrs per nephrology x5  1L fluid restriction         Hypokalemia resolved        HAGMA with concurrent Metabolic alkalosis and respiratory alkalosis 2/2 LA , resolved          Endocrine     Hx DM  Per patient was dx 3 years ago  HbA1c 5.8  Hx LE paraesthesia 2/2 DM   Start low-dose sliding scale  Hypoglycemia protocol  Follow blood sugar level before every meal/at bedtime        Infection     CAP vs Aspiration PNA  CT chest findings show Multifocal bilateral ground-glass pneumonia more prominent within the left upper lobe and left lower lobe  Unasyn 3 g q6 x 5 days (started 11/27)  Elevated procalcitonin  WB WNL  Blood cx (-) at 24 hours, Urine Cx <10,000 mixed gram +  (-) legionella, Strep Pneumo     Heme/Onc     Thrombocytopenia, likely 2/2 reactive in setting of infection   - PBS:Neutrophilia with toxic change and lymphopenia, Mild normocytic anemia without significant polychromasia  -PT/INR: 15.5/1.4  - Haptoglobin : 159  - fibrinogen : 526     MSK        Skin        Psych        Other     Hx Alcohol abuse  Reports drinking a 6 pack daily   Lipase levels on admission 96  high dose thiamine and folic acid   CIWA protocol ordered     Hx Tobacco use (chewing tobacco)      PT/OT evaluation: no  DVT prophylaxis/ GI prophylaxis: Lovenox 40 mg daily / none - diet  Disposition: can transfer out of unit    Venecia Kemp MS4    Attending physician: Dr. Yolande Lu  Department of Pulmonary, Critical Care and Sleep Medicine  [address] [facility]  Department of Internal Medicine      During multidisciplinary team rounds Laurann Duverney is a 59 y.o. male was seen, examined and discussed. This is confirmation that I have personally seen and examined the patient and that the key elements of the encounter were performed by me (> 85 % time).   The medications & laboratory data was discussed and adjusted where necessary. The radiographic images were reviewed or with radiologist or consultant if felt dis-concordant with the exam or history. The above findings were corroborated, plans confirmed and changes made if needed. Family is updated at the bedside as available. Key issues of the case were discussed among consultants. Critical Care time is documented if appropriate.       Radha Logan DO, FACP, FCCP, Jennifer Dobbs,

## 2022-11-28 NOTE — CARE COORDINATION
11/28/22  Spoke with patient regarding transition of Care. Patient admitted for syncope and collapse, hypokalemia, lactic acidosis, hypomagresemia, hyperbilirubinemia, hyponatremia, acute respiratory failure with hypoxia, pneumonia and pulmonary nodule. Patient has a history of alcohol disorder. Patient is on a CIWA and was offered peer recovery support and rehab by this  but declined. Patient is pending and US of bilateral Carotid Artery. Patient is being followed by Cardiology and Neurology. Patient state he lives home alone in a one story house. Patient was independent and driving prior to admit. Patient has no home health or ALEXIA history. Goal is home with no need once medically stable per patient. SW/Cm to follow for any potential discharge needs pending course of treatment.     Electronically signed by RAZA Tolbert on 11/28/2022 at 11:49 AM

## 2022-11-28 NOTE — PROGRESS NOTES
Hospitalist Progress Note      SYNOPSIS: Patient admitted on 2022 for     59years old male patient who has history of hypertension diabetes and daily alcohol intake, consuming 12 pack every day, patient apparently had multiple episodes of syncope at home he was brought into the emergency department CT head was negative CT chest was negative for pulmonary embolism but showed multifocal pneumonia bilaterally, patient respiratory viral panel including COVID-19 testing was negative. Patient was noted to have severe hyponatremia. He was requiring high flow nasal cannula in emergency department, initial sodium was 123 patient was given D5W normal saline emergency department, repeat sodium was 119, patient is being admitted to intensive care unit on hospital medicine service. In ICU, his sodium levels improved to 129 on the morning of . Cardiology recommended holding antihypertensives and obtain echocardiogram.  Neurology recommended no further intervention as most likely cause for patient's syncope/seizure was metabolic. SUBJECTIVE:    Patient seen and examined in the ICU. Patient alert awake oriented x2-3. Resting comfortably. No major overnight events. Denies any chest pain, nausea, vomiting. Records reviewed. Stable overnight. No other overnight issues reported. Temp (24hrs), Av.5 °F (36.9 °C), Min:97.9 °F (36.6 °C), Max:98.8 °F (37.1 °C)    DIET: ADULT DIET; Regular; 5 carb choices (75 gm/meal); High Fiber; 1000 ml  CODE: Full Code    Intake/Output Summary (Last 24 hours) at 2022 0750  Last data filed at 2022 0600  Gross per 24 hour   Intake 2018.41 ml   Output 2000 ml   Net 18.41 ml       OBJECTIVE:    BP 96/62   Pulse 67   Temp 98.7 °F (37.1 °C) (Temporal)   Resp 13   Ht 6' (1.829 m)   Wt 199 lb (90.3 kg)   SpO2 94%   BMI 26.99 kg/m²     General appearance: No apparent distress, appears stated age and cooperative. HEENT:  Conjunctivae/corneas clear.    Neck: Supple. No jugular venous distention. Respiratory: Clear to auscultation bilaterally, normal respiratory effort  Cardiovascular: Regular rate rhythm, normal S1-S2  Abdomen: Soft, nontender, nondistended  Musculoskeletal: No clubbing, cyanosis, no bilateral lower extremity edema. Brisk capillary refill.    Skin:  No rashes  on visible skin  Neurologic: awake, alert and following commands     ASSESSMENT & PLAN:    Severe hyponatremia  Likely beer potomania  Sodium improved to 129 on 11/28     Sepsis with Hypoxic respiratory failure  With possible aspiration pneumonia  CT chest negative for pulmonary embolism, it did show multifocal bilateral groundglass pneumonia prominent within the left upper lobe and left lower lobe, also showed 5 mm pleural-based nodule seen within the left lower lobe  COVID-19 testing negative  Patient was given 1 dose of Rocephin and doxycycline and dexamethasone in emergency department  Currently on Unasyn      Syncope  Multiple episodes prior to coming to the hospital  Differential diagnosis: Hyponatremia, seizure, syncope due to dehydration, in the setting of sepsis, rule out cardiac/cardiovascular etiology  CT head negative for any acute findings  CT chest negative for pulmonary embolism  Will request echocardiogram and carotid artery ultrasound  Will consult cardiology, obtain echocardiogram  Will consult neurology, signed off, attributing neurologic symptoms to electrolyte abnormalities     Electrolyte abnormality likely due to excessive alcohol intake  Hypokalemia  Hypomagnesemia  Continue to monitor, replace as needed     Lactic acidosis  Resolved     Daily alcohol use  Patient used 12 pack every day  CIWA protocol for alcohol withdrawal     Elevated troponin  Flat trend  Cant exclude ACS  Check echo  Cardio consult      History of coronary artery disease  At home on Toprol 50/day, aspirin 81 mg a day, Lipitor 40 mg a day, lisinopril-hydrochlorothiazide 20/25     History of diabetes  Patient on metformin 500 mg a day  We will hold metformin while inpatient  Continues on sliding scale  Diabetic diet     History of hypertension  Patient on Toprol 50, lisinopril-hydrochlorothiazide 20/25/day, Norvasc 10 mg/day  All blood pressure medications on hold for now    Pulmonary nodule  5 mm pleural-based nodule  Outpatient follow-up    DISPOSITION:   Unclear discharge disposition at the moment, further work-up and clinical improvement pending.     Medications:  REVIEWED DAILY    Infusion Medications    sodium chloride      dextrose 75 mL/hr at 11/27/22 2309    sodium chloride      dextrose       Scheduled Medications    folic acid  1 mg Oral Daily    sodium chloride flush  5-40 mL IntraVENous 2 times per day    thiamine (VITAMIN B1) IVPB  500 mg IntraVENous Q8H    Followed by    Olga Lidia Hikes ON 11/29/2022] thiamine (VITAMIN B1) IVPB  250 mg IntraVENous Q24H    Followed by    Olga Lidia Hikes ON 12/4/2022] thiamine  100 mg Oral Daily    chlorhexidine  15 mL Mouth/Throat BID    sodium chloride flush  5-40 mL IntraVENous 2 times per day    multivitamin  1 tablet Oral Daily    sodium chloride flush  5-40 mL IntraVENous 2 times per day    sodium chloride flush  5-40 mL IntraVENous 2 times per day    enoxaparin  40 mg SubCUTAneous Daily    ampicillin-sulbactam  3,000 mg IntraVENous Q6H    sodium chloride flush  5-40 mL IntraVENous 2 times per day    [Held by provider] amLODIPine  10 mg Oral QAM    atorvastatin  40 mg Oral QAM    Vitamin D  1,000 Units Oral QAM    [Held by provider] metoprolol succinate  50 mg Oral QAM    insulin lispro  0-4 Units SubCUTAneous TID WC    insulin lispro  0-4 Units SubCUTAneous Nightly     PRN Meds: sodium chloride flush, sodium chloride, trimethobenzamide, perflutren lipid microspheres, sodium chloride flush, LORazepam **OR** LORazepam **OR** LORazepam **OR** LORazepam **OR** LORazepam **OR** LORazepam **OR** LORazepam **OR** LORazepam, sodium chloride flush, sodium chloride flush, sodium chloride flush, sodium chloride, polyethylene glycol, acetaminophen **OR** acetaminophen, glucose, dextrose bolus **OR** dextrose bolus, glucagon (rDNA), dextrose    Labs:     Recent Labs     11/27/22  0425 11/27/22  1051 11/28/22  0438   WBC 9.3 9.7 7.7   HGB 13.4 12.2* 11.3*   HCT 36.2* 33.3* 31.3*   PLT 97* 107* 91*       Recent Labs     11/27/22  0925 11/27/22  1301 11/27/22  2114 11/28/22  0153 11/28/22  0438   *   < > 125* 126* 129*   K 3.3*   < > 3.6 4.3 3.5   CL 89*   < > 88* 88* 94*   CO2 27   < > 26 30* 28   BUN 12   < > 16 14 12   CREATININE 0.9   < > 0.9 0.9 0.8   CALCIUM 8.4*   < > 8.8 8.4* 8.1*   PHOS 3.1  --   --   --   --     < > = values in this interval not displayed. Recent Labs     11/26/22  1446 11/27/22  1051 11/28/22  0438   PROT 8.7*  --  6.3*   ALKPHOS 138*  --  79   ALT 77*  --  59*   AST 83*  --  80*   BILITOT 1.6*  --  0.7   LIPASE 98* 57  --        Recent Labs     11/27/22  1051   INR 1.4       No results for input(s): CKTOTAL, TROPONINI in the last 72 hours. Chronic labs:    Lab Results   Component Value Date    CHOL 204 (H) 01/17/2012    TRIG 206 (H) 01/17/2012    HDL 39.5 (A) 01/17/2012    LDLCALC 123 (H) 01/17/2012    TSH 0.577 11/27/2022    INR 1.4 11/27/2022    LABA1C 5.8 (H) 11/27/2022       Radiology: REVIEWED DAILY    +++++++++++++++++++++++++++++++++++++++++++++++++  Wilian Longoria MD  [facility] - [address], [place]  +++++++++++++++++++++++++++++++++++++++++++++++++  NOTE: This report was transcribed using voice recognition software. Every effort was made to ensure accuracy; however, inadvertent computerized transcription errors may be present.

## 2022-11-28 NOTE — PROGRESS NOTES
PROGRESS NOTE     CARDIOLOGY    Chief complaint: Seen today for follow up, management & recommendations for following. He denies chest pain or shortness of breath today. He was lying flat in bed. He was comfortable and in no distress. Wt Readings from Last 3 Encounters:   11/28/22 199 lb (90.3 kg)   04/20/19 234 lb (106.1 kg)     Temp Readings from Last 3 Encounters:   11/28/22 98.7 °F (37.1 °C) (Temporal)   11/23/20 96.8 °F (36 °C) (Temporal)     BP Readings from Last 3 Encounters:   11/28/22 106/76   11/23/20 (!) 148/105   04/20/19 (!) 143/76     Pulse Readings from Last 3 Encounters:   11/28/22 77   11/23/20 99   04/20/19 69         Intake/Output Summary (Last 24 hours) at 11/28/2022 0931  Last data filed at 11/28/2022 0817  Gross per 24 hour   Intake 2018.41 ml   Output 2000 ml   Net 18.41 ml       Recent Labs     11/27/22  0425 11/27/22  1051 11/28/22  0438   WBC 9.3 9.7 7.7   HGB 13.4 12.2* 11.3*   HCT 36.2* 33.3* 31.3*   MCV 86.8 86.9 89.4   PLT 97* 107* 91*     Recent Labs     11/26/22  1446 11/26/22  1948 11/27/22  0053 11/27/22  0925 11/27/22  1301 11/28/22  0153 11/28/22  0438 11/28/22  0800   * 119*   < > 129*   < > 126* 129* 128*   K 3.2* 3.2*   < > 3.3*   < > 4.3 3.5 3.7   CL 78* 81*   < > 89*   < > 88* 94* 92*   CO2 25 22   < > 27   < > 30* 28 27   PHOS  --   --   --  3.1  --   --   --   --    BUN 9 11   < > 12   < > 14 12 12   CREATININE 1.1 1.0   < > 0.9   < > 0.9 0.8 0.8   MG 1.4* 1.9  --  2.0  --   --   --   --     < > = values in this interval not displayed. Recent Labs     11/27/22  1051   PROTIME 15.5*   INR 1.4     No results for input(s): CKTOTAL, CKMB, CKMBINDEX, TROPONINI in the last 72 hours. No results for input(s): BNP in the last 72 hours. No results for input(s): CHOL, HDL, TRIG in the last 72 hours.     Invalid input(s): Torres VASQUEZ 58     11/26/22  1446 11/26/22  1633   TROPHS 16* 16*         potassium chloride (KLOR-CON M) extended release tablet 40 mEq, Once  folic acid (FOLVITE) tablet 1 mg, Daily  sodium chloride flush 0.9 % injection 5-40 mL, 2 times per day  sodium chloride flush 0.9 % injection 5-40 mL, PRN  0.9 % sodium chloride infusion, PRN  thiamine (B-1) 500 mg in sodium chloride 0.9 % 100 mL IVPB, Q8H   Followed by  [START ON 11/29/2022] thiamine (B-1) 250 mg in sodium chloride 0.9 % 100 mL IVPB, Q24H   Followed by  Eduin Burleson ON 12/4/2022] thiamine tablet 100 mg, Daily  trimethobenzamide (TIGAN) injection 200 mg, Q6H PRN  chlorhexidine (PERIDEX) 0.12 % solution 15 mL, BID  perflutren lipid microspheres (DEFINITY) injection 1.5 mL, ONCE PRN  dextrose 5 % solution, Continuous  sodium chloride flush 0.9 % injection 5-40 mL, 2 times per day  sodium chloride flush 0.9 % injection 5-40 mL, PRN  multivitamin 1 tablet, Daily  LORazepam (ATIVAN) tablet 1 mg, Q1H PRN   Or  LORazepam (ATIVAN) injection 1 mg, Q1H PRN   Or  LORazepam (ATIVAN) tablet 2 mg, Q1H PRN   Or  LORazepam (ATIVAN) injection 2 mg, Q1H PRN   Or  LORazepam (ATIVAN) tablet 3 mg, Q1H PRN   Or  LORazepam (ATIVAN) injection 3 mg, Q1H PRN   Or  LORazepam (ATIVAN) tablet 4 mg, Q1H PRN   Or  LORazepam (ATIVAN) injection 4 mg, Q1H PRN  sodium chloride flush 0.9 % injection 5-40 mL, 2 times per day  sodium chloride flush 0.9 % injection 5-40 mL, PRN  sodium chloride flush 0.9 % injection 5-40 mL, 2 times per day  sodium chloride flush 0.9 % injection 5-40 mL, PRN  enoxaparin (LOVENOX) injection 40 mg, Daily  ampicillin-sulbactam (UNASYN) 3,000 mg in sodium chloride 0.9 % 100 mL IVPB ([id number]), Q6H  sodium chloride flush 0.9 % injection 5-40 mL, 2 times per day  sodium chloride flush 0.9 % injection 5-40 mL, PRN  0.9 % sodium chloride infusion, PRN  polyethylene glycol (GLYCOLAX) packet 17 g, Daily PRN  acetaminophen (TYLENOL) tablet 650 mg, Q6H PRN   Or  acetaminophen (TYLENOL) suppository 650 mg, Q6H PRN  [Held by provider] amLODIPine (NORVASC) tablet 10 mg, QAM  atorvastatin (LIPITOR) tablet 40 mg, QAM  Vitamin D (CHOLECALCIFEROL) tablet 1,000 Units, QAM  [Held by provider] metoprolol succinate (TOPROL XL) extended release tablet 50 mg, QAM  insulin lispro (HUMALOG) injection vial 0-4 Units, TID WC  insulin lispro (HUMALOG) injection vial 0-4 Units, Nightly  glucose chewable tablet 16 g, PRN  dextrose bolus 10% 125 mL, PRN   Or  dextrose bolus 10% 250 mL, PRN  glucagon (rDNA) injection 1 mg, PRN  dextrose 10 % infusion, Continuous PRN        Review of systems:     Heart: as above   Lungs: as above   Eyes: denies changes in vision or discharge. Ears: denies changes in hearing or pain. Nose: denies epistaxis or masses   Throat: denies sore throat or trouble swallowing. Neuro: denies numbness, tingling, tremors. Skin: denies rashes or itching. : denies hematuria, dysuria   GI: denies vomiting, diarrhea   Psych: denies mood changed, anxiety, depression. Physical exam:    Constitutional: A&O x3, communicates well, no acute distress. Eyes: extraocular muscles intact, PERRL. Normal lids & conjunctiva. No icterus. ENT: clear, no bleeding. No external masses. Lips normal formation. Neck: supple, full ROM, no JVD, no bruits, no lymphadenopathy. No masses. trachea midline. Heart: regular rate & rhythm, normal S1 & S2, no abnormal murmurs. No heave. Lungs: CTA. No accessory muscles. Abd: soft, non-tender. Normal bowel sounds. Neuro: Full ROM X 4, EOMI, no tremors. EXT: No bilateral lower extremity edema  Skin: warm, dry, intact. Good turgor. Psych: A&O x 3, normal behavior, not anxious. Assessment/Recommendations  Falling. He denies any syncope. Alcohol abuse. Echo is pending. Evaluate for alcoholic cardiomyopathy. Former smoker. Now chewing tobacco.  Diabetes  Hypertension. Running low this admission. Possibly due to the fluid restriction. Multiple severe electrolyte abnormalities.   I will replace the potassium today but will defer further to others. Note: This report was completed using computerized voice recognition software. Every effort has been made to ensure accuracy, however; and invert and computerized transcription errors may be present.

## 2022-11-28 NOTE — PLAN OF CARE
Problem: Discharge Planning  Goal: Discharge to home or other facility with appropriate resources  Outcome: Progressing     Problem: Safety - Adult  Goal: Free from fall injury  Outcome: Progressing     Problem: Neurosensory - Adult  Goal: Absence of seizures  Outcome: Progressing  Goal: Remains free of injury related to seizures activity  Outcome: Progressing     Problem: Respiratory - Adult  Goal: Achieves optimal ventilation and oxygenation  Outcome: Progressing     Problem: Cardiovascular - Adult  Goal: Absence of cardiac dysrhythmias or at baseline  Outcome: Progressing     Problem: Skin/Tissue Integrity - Adult  Goal: Skin integrity remains intact  Outcome: Progressing  Goal: Oral mucous membranes remain intact  Outcome: Progressing     Problem: Gastrointestinal - Adult  Goal: Minimal or absence of nausea and vomiting  Outcome: Progressing  Goal: Maintains adequate nutritional intake  Outcome: Progressing     Problem: Genitourinary - Adult  Goal: Absence of urinary retention  Outcome: Progressing     Problem: Metabolic/Fluid and Electrolytes - Adult  Goal: Glucose maintained within prescribed range  Outcome: Progressing     Problem: ABCDS Injury Assessment  Goal: Absence of physical injury  Outcome: Progressing

## 2022-11-29 ENCOUNTER — APPOINTMENT (OUTPATIENT)
Dept: GENERAL RADIOLOGY | Age: 57
DRG: 871 | End: 2022-11-29

## 2022-11-29 LAB
ALBUMIN SERPL-MCNC: 3 G/DL (ref 3.5–5.2)
ALP BLD-CCNC: 100 U/L (ref 40–129)
ALT SERPL-CCNC: 71 U/L (ref 0–40)
ANION GAP SERPL CALCULATED.3IONS-SCNC: 10 MMOL/L (ref 7–16)
ANION GAP SERPL CALCULATED.3IONS-SCNC: 11 MMOL/L (ref 7–16)
ANION GAP SERPL CALCULATED.3IONS-SCNC: 12 MMOL/L (ref 7–16)
ANION GAP SERPL CALCULATED.3IONS-SCNC: 12 MMOL/L (ref 7–16)
AST SERPL-CCNC: 89 U/L (ref 0–39)
BASOPHILS ABSOLUTE: 0.03 E9/L (ref 0–0.2)
BASOPHILS RELATIVE PERCENT: 0.5 % (ref 0–2)
BILIRUB SERPL-MCNC: 0.6 MG/DL (ref 0–1.2)
BUN BLDV-MCNC: 11 MG/DL (ref 6–20)
BUN BLDV-MCNC: 12 MG/DL (ref 6–20)
BUN BLDV-MCNC: 13 MG/DL (ref 6–20)
BUN BLDV-MCNC: 15 MG/DL (ref 6–20)
CALCIUM SERPL-MCNC: 8.5 MG/DL (ref 8.6–10.2)
CALCIUM SERPL-MCNC: 8.6 MG/DL (ref 8.6–10.2)
CALCIUM SERPL-MCNC: 8.7 MG/DL (ref 8.6–10.2)
CALCIUM SERPL-MCNC: 8.9 MG/DL (ref 8.6–10.2)
CALCIUM SERPL-MCNC: 8.9 MG/DL (ref 8.6–10.2)
CHLORIDE BLD-SCNC: 94 MMOL/L (ref 98–107)
CHLORIDE BLD-SCNC: 94 MMOL/L (ref 98–107)
CHLORIDE BLD-SCNC: 95 MMOL/L (ref 98–107)
CHLORIDE BLD-SCNC: 96 MMOL/L (ref 98–107)
CHLORIDE BLD-SCNC: 97 MMOL/L (ref 98–107)
CO2: 22 MMOL/L (ref 22–29)
CO2: 24 MMOL/L (ref 22–29)
CO2: 24 MMOL/L (ref 22–29)
CO2: 25 MMOL/L (ref 22–29)
CO2: 25 MMOL/L (ref 22–29)
CO2: 26 MMOL/L (ref 22–29)
CO2: 26 MMOL/L (ref 22–29)
CREAT SERPL-MCNC: 0.7 MG/DL (ref 0.7–1.2)
CREAT SERPL-MCNC: 0.7 MG/DL (ref 0.7–1.2)
CREAT SERPL-MCNC: 0.8 MG/DL (ref 0.7–1.2)
CREAT SERPL-MCNC: 0.9 MG/DL (ref 0.7–1.2)
CREAT SERPL-MCNC: 1 MG/DL (ref 0.7–1.2)
EOSINOPHILS ABSOLUTE: 0.12 E9/L (ref 0.05–0.5)
EOSINOPHILS RELATIVE PERCENT: 1.9 % (ref 0–6)
GFR SERPL CREATININE-BSD FRML MDRD: >60 ML/MIN/1.73
GLUCOSE BLD-MCNC: 103 MG/DL (ref 74–99)
GLUCOSE BLD-MCNC: 104 MG/DL (ref 74–99)
GLUCOSE BLD-MCNC: 108 MG/DL (ref 74–99)
GLUCOSE BLD-MCNC: 112 MG/DL (ref 74–99)
GLUCOSE BLD-MCNC: 126 MG/DL (ref 74–99)
GLUCOSE BLD-MCNC: 137 MG/DL (ref 74–99)
GLUCOSE BLD-MCNC: 142 MG/DL (ref 74–99)
HCT VFR BLD CALC: 29.3 % (ref 37–54)
HEMOGLOBIN: 10.3 G/DL (ref 12.5–16.5)
IMMATURE GRANULOCYTES #: 0.03 E9/L
IMMATURE GRANULOCYTES %: 0.5 % (ref 0–5)
LYMPHOCYTES ABSOLUTE: 1.2 E9/L (ref 1.5–4)
LYMPHOCYTES RELATIVE PERCENT: 18.6 % (ref 20–42)
MCH RBC QN AUTO: 32.1 PG (ref 26–35)
MCHC RBC AUTO-ENTMCNC: 35.2 % (ref 32–34.5)
MCV RBC AUTO: 91.3 FL (ref 80–99.9)
METER GLUCOSE: 129 MG/DL (ref 74–99)
METER GLUCOSE: 132 MG/DL (ref 74–99)
METER GLUCOSE: 136 MG/DL (ref 74–99)
MONOCYTES ABSOLUTE: 0.58 E9/L (ref 0.1–0.95)
MONOCYTES RELATIVE PERCENT: 9 % (ref 2–12)
NEUTROPHILS ABSOLUTE: 4.49 E9/L (ref 1.8–7.3)
NEUTROPHILS RELATIVE PERCENT: 69.5 % (ref 43–80)
PDW BLD-RTO: 12.3 FL (ref 11.5–15)
PLATELET # BLD: 98 E9/L (ref 130–450)
PLATELET CONFIRMATION: NORMAL
PMV BLD AUTO: 9.7 FL (ref 7–12)
POTASSIUM SERPL-SCNC: 3.8 MMOL/L (ref 3.5–5)
POTASSIUM SERPL-SCNC: 3.9 MMOL/L (ref 3.5–5)
POTASSIUM SERPL-SCNC: 4 MMOL/L (ref 3.5–5)
POTASSIUM SERPL-SCNC: 4.5 MMOL/L (ref 3.5–5)
RBC # BLD: 3.21 E12/L (ref 3.8–5.8)
SODIUM BLD-SCNC: 129 MMOL/L (ref 132–146)
SODIUM BLD-SCNC: 130 MMOL/L (ref 132–146)
SODIUM BLD-SCNC: 131 MMOL/L (ref 132–146)
SODIUM BLD-SCNC: 131 MMOL/L (ref 132–146)
SODIUM BLD-SCNC: 132 MMOL/L (ref 132–146)
TOTAL PROTEIN: 6.9 G/DL (ref 6.4–8.3)
WBC # BLD: 6.5 E9/L (ref 4.5–11.5)

## 2022-11-29 PROCEDURE — 6370000000 HC RX 637 (ALT 250 FOR IP): Performed by: INTERNAL MEDICINE

## 2022-11-29 PROCEDURE — 80048 BASIC METABOLIC PNL TOTAL CA: CPT

## 2022-11-29 PROCEDURE — 80053 COMPREHEN METABOLIC PANEL: CPT

## 2022-11-29 PROCEDURE — 97165 OT EVAL LOW COMPLEX 30 MIN: CPT

## 2022-11-29 PROCEDURE — 36415 COLL VENOUS BLD VENIPUNCTURE: CPT

## 2022-11-29 PROCEDURE — 6360000002 HC RX W HCPCS: Performed by: INTERNAL MEDICINE

## 2022-11-29 PROCEDURE — 97535 SELF CARE MNGMENT TRAINING: CPT

## 2022-11-29 PROCEDURE — 2580000003 HC RX 258: Performed by: INTERNAL MEDICINE

## 2022-11-29 PROCEDURE — 71045 X-RAY EXAM CHEST 1 VIEW: CPT

## 2022-11-29 PROCEDURE — 85025 COMPLETE CBC W/AUTO DIFF WBC: CPT

## 2022-11-29 PROCEDURE — 2060000000 HC ICU INTERMEDIATE R&B

## 2022-11-29 PROCEDURE — 82962 GLUCOSE BLOOD TEST: CPT

## 2022-11-29 RX ADMIN — THIAMINE HYDROCHLORIDE 250 MG: 100 INJECTION, SOLUTION INTRAMUSCULAR; INTRAVENOUS at 14:42

## 2022-11-29 RX ADMIN — THIAMINE HYDROCHLORIDE 500 MG: 100 INJECTION, SOLUTION INTRAMUSCULAR; INTRAVENOUS at 06:37

## 2022-11-29 RX ADMIN — ENOXAPARIN SODIUM 40 MG: 100 INJECTION SUBCUTANEOUS at 09:14

## 2022-11-29 RX ADMIN — ATORVASTATIN CALCIUM 40 MG: 40 TABLET, FILM COATED ORAL at 09:13

## 2022-11-29 RX ADMIN — AMPICILLIN SODIUM AND SULBACTAM SODIUM 3000 MG: 2; 1 INJECTION, POWDER, FOR SOLUTION INTRAMUSCULAR; INTRAVENOUS at 06:15

## 2022-11-29 RX ADMIN — Medication 10 ML: at 21:40

## 2022-11-29 RX ADMIN — Medication 10 ML: at 09:15

## 2022-11-29 RX ADMIN — Medication 1000 UNITS: at 09:14

## 2022-11-29 RX ADMIN — LORAZEPAM 2 MG: 1 TABLET ORAL at 00:09

## 2022-11-29 RX ADMIN — MULTIVITAMIN TABLET 1 TABLET: TABLET at 09:13

## 2022-11-29 RX ADMIN — SODIUM CHLORIDE, PRESERVATIVE FREE 10 ML: 5 INJECTION INTRAVENOUS at 09:14

## 2022-11-29 RX ADMIN — SODIUM CHLORIDE, PRESERVATIVE FREE 10 ML: 5 INJECTION INTRAVENOUS at 21:38

## 2022-11-29 RX ADMIN — ACETAMINOPHEN 650 MG: 325 TABLET ORAL at 21:39

## 2022-11-29 RX ADMIN — FOLIC ACID 1 MG: 1 TABLET ORAL at 09:14

## 2022-11-29 RX ADMIN — AMPICILLIN SODIUM AND SULBACTAM SODIUM 3000 MG: 2; 1 INJECTION, POWDER, FOR SOLUTION INTRAMUSCULAR; INTRAVENOUS at 13:14

## 2022-11-29 NOTE — PROGRESS NOTES
Hospitalist Progress Note      SYNOPSIS: Patient admitted on 2022 for Hyponatremia      SUBJECTIVE:    Patient seen and examined. Patient denies any cough or problems breathing. He was concerned about staying in the hospital until Friday and was curious if he could transition to oral antibiotics so he could go home tomorrow if all the testing are negative. Discussed we will check with pulmonology. Records reviewed. 59years old male patient who has history of hypertension diabetes and daily alcohol intake, consuming 12 pack every day, patient apparently had multiple episodes of syncope at home he was brought into the emergency department CT head was negative CT chest was negative for pulmonary embolism but showed multifocal pneumonia bilaterally, patient respiratory viral panel including COVID-19 testing was negative. Patient was noted to have severe hyponatremia. He was requiring high flow nasal cannula in emergency department, initial sodium was 123 patient was given D5W normal saline emergency department, repeat sodium was 119, patient is being admitted to intensive care unit on hospital medicine service. In ICU, his sodium levels improved to 129 on the morning of . Cardiology recommended holding antihypertensives and obtain echocardiogram.  Neurology recommended no further intervention as most likely cause for patient's syncope/seizure was metabolic. Stable overnight. No other overnight issues reported. Temp (24hrs), Av.8 °F (36.6 °C), Min:97.6 °F (36.4 °C), Max:98 °F (36.7 °C)    DIET: ADULT DIET; Regular; 5 carb choices (75 gm/meal);  High Fiber; 1000 ml  CODE: Full Code    Intake/Output Summary (Last 24 hours) at 2022 1401  Last data filed at 2022 0347  Gross per 24 hour   Intake 700 ml   Output --   Net 700 ml       OBJECTIVE:    /64   Pulse 81   Temp 97.7 °F (36.5 °C) (Temporal)   Resp 18   Ht 6' (1.829 m)   Wt 210 lb 8 oz (95.5 kg)   SpO2 97%   BMI 28.55 kg/m²     General appearance: No apparent distress, appears stated age and cooperative. HEENT:  Conjunctivae/corneas clear. Neck: Supple. No jugular venous distention. Respiratory: Clear to auscultation bilaterally, normal respiratory effort  Cardiovascular: Regular rate rhythm, normal S1-S2  Abdomen: Soft, nontender, nondistended  Musculoskeletal: No clubbing, cyanosis, no bilateral lower extremity edema. Brisk capillary refill. Skin:  No rashes  on visible skin  Neurologic: awake, alert and following commands     ASSESSMENT:  1. Acute hypoxic respiratory failure  2. Syncope likely due to dehydration low blood pressure  3. Hyponatremia  4. Aspiration pneumonia  5. Thrombocytopenia likely from underlying infection and alcohol use  6. Alcohol abuse with concern for withdrawal  7. Coronary artery disease  8. Type 2 diabetes      PLAN:  1. Continue IV Unasyn until 12-2-22 but he is anxious to go home tomorrow. Appreciate pulmonology's input. Continue current withdrawal protocol. Nephrology following for hyponatremia sodium improving 131 today. Continue with blood pressure medications lisinopril, hydrochlorothiazide, Lopressor and Norvasc. Echocardiogram reviewed normal ejection fraction 65% with No mention of any significant valvular abnormality just trace tricuspid regurgitation. DISPOSITION: Home 12-2-22 after completing IV Unasyn. Refused rehab.     Medications:  REVIEWED DAILY    Infusion Medications    sodium chloride      dextrose       Scheduled Medications    folic acid  1 mg Oral Daily    sodium chloride flush  5-40 mL IntraVENous 2 times per day    thiamine (VITAMIN B1) IVPB  250 mg IntraVENous Q24H    Followed by    Yane Bazzi ON 12/4/2022] thiamine  100 mg Oral Daily    sodium chloride flush  5-40 mL IntraVENous 2 times per day    multivitamin  1 tablet Oral Daily    enoxaparin  40 mg SubCUTAneous Daily    ampicillin-sulbactam  3,000 mg IntraVENous Q6H    [Held by provider] amLODIPine  10 mg Oral QAM    atorvastatin  40 mg Oral QAM    Vitamin D  1,000 Units Oral QAM    [Held by provider] metoprolol succinate  50 mg Oral QAM    insulin lispro  0-4 Units SubCUTAneous TID WC    insulin lispro  0-4 Units SubCUTAneous Nightly     PRN Meds: sodium chloride, trimethobenzamide, perflutren lipid microspheres, LORazepam **OR** LORazepam **OR** LORazepam **OR** LORazepam **OR** LORazepam **OR** LORazepam **OR** LORazepam **OR** LORazepam, polyethylene glycol, acetaminophen **OR** acetaminophen, glucose, dextrose bolus **OR** dextrose bolus, glucagon (rDNA), dextrose    Labs:     Recent Labs     11/27/22  1051 11/28/22  0438 11/29/22 0443   WBC 9.7 7.7 6.5   HGB 12.2* 11.3* 10.3*   HCT 33.3* 31.3* 29.3*   * 91* 98*       Recent Labs     11/27/22  0925 11/27/22  1301 11/29/22  0133 11/29/22  0443 11/29/22  1010   *   < > 130* 131* 131*   K 3.3*   < > 3.9 4.5 3.9   CL 89*   < > 94* 95* 95*   CO2 27   < > 25 26 26   BUN 12   < > 13 12 11   CREATININE 0.9   < > 0.9 0.9 0.9   CALCIUM 8.4*   < > 8.6 8.7 8.5*   PHOS 3.1  --   --   --   --     < > = values in this interval not displayed. Recent Labs     11/26/22  1446 11/27/22  1051 11/28/22  0438 11/29/22  0443   PROT 8.7*  --  6.3* 6.9   ALKPHOS 138*  --  79 100   ALT 77*  --  59* 71*   AST 83*  --  80* 89*   BILITOT 1.6*  --  0.7 0.6   LIPASE 98* 57  --   --        Recent Labs     11/27/22  1051   INR 1.4       No results for input(s): Lizzie Jackson in the last 72 hours.     Chronic labs:    Lab Results   Component Value Date    CHOL 204 (H) 01/17/2012    TRIG 206 (H) 01/17/2012    HDL 39.5 (A) 01/17/2012    LDLCALC 123 (H) 01/17/2012    TSH 0.577 11/27/2022    INR 1.4 11/27/2022    LABA1C 5.8 (H) 11/27/2022       Radiology: REVIEWED DAILY    +++++++++++++++++++++++++++++++++++++++++++++++++  Chantel Hendrickson MD  [facility] - [address], [place]  +++++++++++++++++++++++++++++++++++++++++++++++++  NOTE: This report was transcribed using voice recognition software. Every effort was made to ensure accuracy; however, inadvertent computerized transcription errors may be present.

## 2022-11-29 NOTE — PROGRESS NOTES
Call placed to pharmacy to find out if pt's IV thiamine was ready, they stated that it was sent to [facility] so they were going to call them to have them send it up to us.

## 2022-11-29 NOTE — PROGRESS NOTES
Department of Internal Medicine  Nephrology  Consult Note    Events reviewed. SUBJECTIVE: We are following Melani Rebolledo for hyponatremia. Reports no complaints.     PHYSICAL EXAM:      Vitals:    VITALS:  /64   Pulse 81   Temp 97.7 °F (36.5 °C) (Temporal)   Resp 18   Ht 6' (1.829 m)   Wt 210 lb 8 oz (95.5 kg)   SpO2 97%   BMI 28.55 kg/m²   24HR INTAKE/OUTPUT:    Intake/Output Summary (Last 24 hours) at 11/29/2022 0582  Last data filed at 11/29/2022 0347  Gross per 24 hour   Intake 1190 ml   Output 400 ml   Net 790 ml         Constitutional:  Alert, oriented, NAD  HEENT:  PERRLA, normocephalic  Respiratory:  CTA  Cardiovascular/Edema:  No edema, normal S1, S2, RRR  Gastrointestinal:  Soft, nondistended, nontender  Neurologic:  Nonfocal, oriented x 3  Skin:  No rashes, lesions, warm and dry    Scheduled Meds:   folic acid  1 mg Oral Daily    sodium chloride flush  5-40 mL IntraVENous 2 times per day    thiamine (VITAMIN B1) IVPB  250 mg IntraVENous Q24H    Followed by    Lilliam Lazcano ON 12/4/2022] thiamine  100 mg Oral Daily    sodium chloride flush  5-40 mL IntraVENous 2 times per day    multivitamin  1 tablet Oral Daily    enoxaparin  40 mg SubCUTAneous Daily    ampicillin-sulbactam  3,000 mg IntraVENous Q6H    [Held by provider] amLODIPine  10 mg Oral QAM    atorvastatin  40 mg Oral QAM    Vitamin D  1,000 Units Oral QAM    [Held by provider] metoprolol succinate  50 mg Oral QAM    insulin lispro  0-4 Units SubCUTAneous TID WC    insulin lispro  0-4 Units SubCUTAneous Nightly     Continuous Infusions:   sodium chloride      dextrose       PRN Meds:.sodium chloride, trimethobenzamide, perflutren lipid microspheres, LORazepam **OR** LORazepam **OR** LORazepam **OR** LORazepam **OR** LORazepam **OR** LORazepam **OR** LORazepam **OR** LORazepam, polyethylene glycol, acetaminophen **OR** acetaminophen, glucose, dextrose bolus **OR** dextrose bolus, glucagon (rDNA), dextrose      DATA:    CBC with Differential:    Lab Results   Component Value Date/Time    WBC 6.5 11/29/2022 04:43 AM    RBC 3.21 11/29/2022 04:43 AM    HGB 10.3 11/29/2022 04:43 AM    HCT 29.3 11/29/2022 04:43 AM    PLT 98 11/29/2022 04:43 AM    MCV 91.3 11/29/2022 04:43 AM    MCH 32.1 11/29/2022 04:43 AM    MCHC 35.2 11/29/2022 04:43 AM    RDW 12.3 11/29/2022 04:43 AM    LYMPHOPCT 18.6 11/29/2022 04:43 AM    MONOPCT 9.0 11/29/2022 04:43 AM    BASOPCT 0.5 11/29/2022 04:43 AM    MONOSABS 0.58 11/29/2022 04:43 AM    LYMPHSABS 1.20 11/29/2022 04:43 AM    EOSABS 0.12 11/29/2022 04:43 AM    BASOSABS 0.03 11/29/2022 04:43 AM     CMP:    Lab Results   Component Value Date/Time     11/29/2022 04:43 AM    K 4.5 11/29/2022 04:43 AM    K 3.2 11/26/2022 07:48 PM    CL 95 11/29/2022 04:43 AM    CO2 26 11/29/2022 04:43 AM    BUN 12 11/29/2022 04:43 AM    CREATININE 0.9 11/29/2022 04:43 AM    GFRAA >60 10/14/2014 06:15 PM    LABGLOM >60 11/29/2022 04:43 AM    GLUCOSE 104 11/29/2022 04:43 AM    GLUCOSE 91 01/17/2012 01:55 AM    PROT 6.9 11/29/2022 04:43 AM    LABALBU 3.0 11/29/2022 04:43 AM    LABALBU 3.6 01/17/2012 01:55 AM    CALCIUM 8.7 11/29/2022 04:43 AM    BILITOT 0.6 11/29/2022 04:43 AM    ALKPHOS 100 11/29/2022 04:43 AM    AST 89 11/29/2022 04:43 AM    ALT 71 11/29/2022 04:43 AM     Magnesium:    Lab Results   Component Value Date/Time    MG 2.0 11/27/2022 09:25 AM     Phosphorus:    Lab Results   Component Value Date/Time    PHOS 3.1 11/27/2022 09:25 AM     Radiology Review:    CXR 11/27/22   No acute process. CT Abd/pelvis 11/26/22   No evidence of acute abdominal/pelvic pathology is seen. Airspace opacification visualized in the lung fields would recommend clinical   correlation for COVID-19 disease or pneumonia. CTA chest 11/26/22   1. There is no pulmonary embolus       2. Multifocal bilateral ground-glass pneumonia more prominent within the   left upper lobe and left lower lobe.        3.  5 mm pleural based nodule seen within the left lower lobe on axial image   number 124           BRIEF SUMMARY OF INITIAL CONSULT:    Guanaco Lopes is a 69-year-old male, past medical history significant for HTN, DM type II, EtOH abuse and tobacco abuse (chewing) who presented to the ED on 11/26/22 complaints of syncopal episodes s/p fall. Initial lab work significant for sodium level 123, reason for this consultation. Patient reports two recent episodes of vomiting with minimal output, denies frequent urination, denies diarrhea, endorses high fluid intake. Medications include hydrochlorothiazide, metformin, lisinopril, denies use of naproxen. Problems resolved:  HAGMA 2/2 lactic acidosis/ketoacidosis (starvation/alcoholic) and metabolic alkalosis 2/2 vomiting  Hypokalemia 2/2 GI losses and diuretics, replaced    IMPRESSION/RECOMMENDATIONS:        Hypotonic hyponatremia,multifactorial, with component of hemodynamically mediated due to intravascular volume depletion 2/2 vomiting in the setting of thiazide diuretic administration. Serum Cl- (corrected) 90 on admission, Alvarez 23, UCl<20, UOsm 289, .Alvarez+K:Serum Na: 0.7. Sodium levels improved and adequate rate. HTN, on metoprolol  ---------------------------------------------------------  Type II DM  Mild transaminitis, alcohol induced  CAD, on atorvastatin  ETOH abuse  Pneumonia, CAP vs aspiration, on ampicillin-sulbactam    Plan:    Continue to monitor sodium levels   Continue fluid restriction,1 L  Discharge planning.        Electronically signed by Amaury Beyer MD on 11/29/2022 at 9:52 AM

## 2022-11-29 NOTE — CARE COORDINATION
Case reviewed with Dr Radha Nogueira. He just spoke with patient and patient is anxious to transition to home. He asked that this CM reach out to Dr Gini Radford re: changing antibiotics to oral for discharge to home tomorrow rather than remaining in the hospital until 12/2 for IV Unasyn. Perfect [employer] message sent with request and message read. Occupational therapy worked with patient today. Patient was CGA within household distance, but noted unsteady gait. Per OT, patient declined any therapy after discharge. Await input from PT as well for transition of care planning needs. Patient is over income for Medicaid, is eligible for HFA, but is not eligible for help with meds at discharge. CM will continue to follow.      Marcelino Mendez RN.  [facility]  [phone]

## 2022-11-29 NOTE — PROGRESS NOTES
[address]        WAZL:                                                  Patient Name: Jaci Duncan    MRN: [id number]    : 1965    Room: [address] [place] [address]      Evaluating OT: John Luoer, [address]al[address] OTR/L; [id number]      Referring Provider: Jude Bal DO    Specific Provider Orders/Date: OT Eval and Treat 22      Diagnosis:  Syncope and collapse  Hyponatremia    Surgery:  none this admission    Pertinent Medical History:  has a past medical history of Diabetes ([facility] [address] and Hypertension.       Reason for Admission: multiple syncopal episodes with vomiting and positive orthostatic hypotension (per EMS), girlfriend reported pt passing out intermittently with history of alcohol abuse     Recommended Adaptive Equipment:  shower chair (pt currently owns)     Precautions:  Fall Risk, Bed/Chair Alarm, Alcohol Abuse     Assessment of current deficits:    [x] Functional mobility  [x]ADLs  [x] Strength               []Cognition    [x] Functional transfers   [x] IADLs         [x] Safety Awareness   [x]Endurance    [x] Fine Coordination              [x] Balance      [] Vision/perception   []Sensation     []Gross Motor Coordination  [] ROM  [] Delirium                   [] Motor Control     OT PLAN OF CARE   OT POC based on physician orders, patient diagnosis and results of clinical assessment    Frequency/Duration: 1-3 days/wk for 2 weeks PRN   Specific OT Treatment Interventions to include:   * Instruction/training on adapted ADL techniques and AE recommendations to increase functional independence within precautions       * Training on energy conservation strategies, correct breathing pattern and techniques to improve independence/tolerance for self-care routine  * Functional transfer/mobility training/DME recommendations for increased independence, safety, and fall Independent    Bed Mobility  Log Roll: IND  Supine to sit: SBA   Sit to supine: SBA   Supine to sit: IND   Sit to supine: IND    Functional Transfers Sit to stand: SBA   Stand to sit:SBA  Stand pivot: CGA  Commode: SBA  Sit to stand:IND   Stand to sit:IND  Stand pivot: IND  Commode: IND    Functional Mobility CGA   within household distance  Noted unsteady gait increased difficulty with directional turning  Educated on decreased gait speed to improve safety and independence  IND    Balance Sitting:     Static - SBA     Dynamic - SBA  Standing: CGA  Sitting:  Static: IND  Dynamic: IND  Standing: IND   Activity Tolerance FAIR  Pt states he feels close to his baseline however reports he \"hasn't walked in 5 days\" and admitted to unsteady gait  GOOD   Visual/  Perceptual Glasses: yes          Vitals   SpO2 pre activity on RA: 96%  SpO2 post activity on RA: 95%         BUE  ROM/Strength/  Fine motor Coordination Hand dominance: Right     RUE: ROM WFL     Strength: 4-/5      Strength: FAIR     Coordination:  FAIR     LUE: ROM WFL     Strength: 4-/5      Strength: FAIR     Coordination:  FAIR  increase BUE muscle strength for improved indep with functional transfers     Hearing: WFL   Sensation:  No c/o numbness or tingling  Tone: WFL   Edema: unremarkable    Patient/Family Goal: pt goal is to return home - declined outpatient therapy    Comment: Cleared by RN to see pt. Upon arrival patient lying supine in bed and agreeable to OT session. At end of session, patient seated in bedside chair with call light and phone within reach, all lines and tubes intact. Overall patient demonstrated decreased independence and safety during completion of ADL/functional transfer/mobility tasks. Pt would benefit from continued skilled OT to increase safety and independence with completion of ADL/IADL tasks for functional independence and quality of life.      Treatment:  Pt required vc's for proper technique/safety with hand placement/body mechanics/posture for bed mobility/ADLs/functional tranfers/mobility/ww management. Pt required vc's for sequencing/initiation of ADLs/functional transfers. Pt able to  sit EOB ~5 mins and at sink ~2 mins to increase core strength/balance/activity tolerance for ease with ADLs. Pt required increased time to complete ADLs/functional transfers due to overall unsteadiness/weakness. Pt required skilled monitoring of SpO2 during session and education on energy conservation techniques. Pt required rest breaks during session and educated on pursed lip breathing. Pt appeared to have tolerated session well and appears motivated/cooperative/pleasant . Pt instructed on use of call light for assistance and fall prevention. Pt demo'ing fair understanding of education provided. Continue to educate. Rehab Potential: Good  for established goals       LTG: maximize independence with ADLs to return to PLOF    Patient and/or family were instructed on functional diagnosis, prognosis/goals and OT plan of care. Demonstrated FAIR understanding. [] Malnutrition indicators have been identified and nursing has been notified to ensure a dietitian consult is ordered. Eval Complexity: Low    Evaluation time includes thorough review of current medical information, gathering information on past medical & social history & PLOF, completion of standardized testing, informal observation of tasks, consultation with other medical professions/disciplines, assessment of data & development of POC/goals.      Time In: 1030  Time Out: 1055  Total Treatment Time: 10    Min Units   OT Eval Low 51340  x     OT Eval Medium 48809      OT Eval High 27490      OT Re-Eval [id number]       Therapeutic Ex 00988       Therapeutic Activities 21533       ADL/Self Care 92749  10  1   Orthotic Management 66423       Manual 73296     Neuro Re-Ed 72964       Non-Billable Time          Evaluation Time additionally includes thorough review of current medical information, gathering information on past medical history/social history and prior level of function, interpretation of standardized testing/informal observation of tasks, assessment of data and development of plan of care and goals.           MASON Wright OTR/L; [id number]

## 2022-11-29 NOTE — PROGRESS NOTES
[address] [facility]  Department of Internal Medicine  Division of Pulmonary, Critical Care and Sleep Medicine      Patient:  Susan Batista 59 y.o. male  MRN: [id number]     Date of Service: 11/29/2022    Allergy: Patient has no known allergies. Subjective     Patient was seen and examined at the bedside in no acute distress. He asked when he can be discharged, explained that as soon as we received subsequent labs and parameters including clinical status remain stable. ROS: Denies Fever/chills/CP/SOB/N/V/D/C/Dysuria/Blood in stool or urine  Objective     VS: /64   Pulse 81   Temp 97.7 °F (36.5 °C) (Temporal)   Resp 18   Ht 6' (1.829 m)   Wt 210 lb 8 oz (95.5 kg)   SpO2 97%   BMI 28.55 kg/m²           I & O - 24hr:   Intake/Output Summary (Last 24 hours) at 11/29/2022 1111  Last data filed at 11/29/2022 0347  Gross per 24 hour   Intake 940 ml   Output 400 ml   Net 540 ml         Physical Exam:  General Appearance: alert, appears stated age, and cooperative  Neck: supple, symmetrical, trachea midline  Lung: clear to auscultation bilaterally  Heart: regular rate and rhythm, S1, S2 normal, no murmur, click, rub or gallop  Abdomen: soft, non-tender; bowel sounds normal; no masses,  no organomegaly  Extremities:  extremities normal, atraumatic, no cyanosis or edema  Musculoskeletal: No joint swelling, no muscle tenderness. ROM normal in all joints of extremities.    Neurologic: Mental status: Alert, oriented, thought content appropriate    Lines     site day    Art line   None    TLC None    PICC None    Hemoaccess None       ABG:     Lab Results   Component Value Date/Time    PH 7.520 11/26/2022 02:46 PM    PCO2 29.2 11/26/2022 02:46 PM    PO2 61.8 11/26/2022 02:46 PM    HCO3 23.3 11/26/2022 02:46 PM    BE 1.6 11/26/2022 02:46 PM    THB 15.3 11/26/2022 02:46 PM    O2SAT 92.3 11/26/2022 02:46 PM        Medications     Nutrition: Diet    ATB:   Antibiotics  Days   Unasyn 2             Labs   CBC with Differential:    Lab Results   Component Value Date/Time    WBC 6.5 11/29/2022 04:43 AM    RBC 3.21 11/29/2022 04:43 AM    HGB 10.3 11/29/2022 04:43 AM    HCT 29.3 11/29/2022 04:43 AM    PLT 98 11/29/2022 04:43 AM    MCV 91.3 11/29/2022 04:43 AM    MCH 32.1 11/29/2022 04:43 AM    MCHC 35.2 11/29/2022 04:43 AM    RDW 12.3 11/29/2022 04:43 AM    LYMPHOPCT 18.6 11/29/2022 04:43 AM    MONOPCT 9.0 11/29/2022 04:43 AM    BASOPCT 0.5 11/29/2022 04:43 AM    MONOSABS 0.58 11/29/2022 04:43 AM    LYMPHSABS 1.20 11/29/2022 04:43 AM    EOSABS 0.12 11/29/2022 04:43 AM    BASOSABS 0.03 11/29/2022 04:43 AM     BMP:    Lab Results   Component Value Date/Time     11/29/2022 04:43 AM    K 4.5 11/29/2022 04:43 AM    K 3.2 11/26/2022 07:48 PM    CL 95 11/29/2022 04:43 AM    CO2 26 11/29/2022 04:43 AM    BUN 12 11/29/2022 04:43 AM    LABALBU 3.0 11/29/2022 04:43 AM    LABALBU 3.6 01/17/2012 01:55 AM    CREATININE 0.9 11/29/2022 04:43 AM    CALCIUM 8.7 11/29/2022 04:43 AM    GFRAA >60 10/14/2014 06:15 PM    LABGLOM >60 11/29/2022 04:43 AM    GLUCOSE 104 11/29/2022 04:43 AM    GLUCOSE 91 01/17/2012 01:55 AM     Sodium:    Lab Results   Component Value Date/Time     11/29/2022 04:43 AM     Assessment and Plan     Neuro  Possible Seizures  Neurology consulted per primary team  No concern for seizure  Neurology signed off     Cardio  R/o syncope   Cardiology consulted per primary team   Carotid bilateral U/S  Follow pending Echo    Follow orthostatics      Hx of HTN   BP soft, 96/62  Continue to hold  home Lopressor and Norvasc  Continue to hold Lisinopril/HCTZ     Hx HFpEF (Stage 1 DD)  Last Echo 65% with Stage one diastolic dysfunction - 7822  Follow pending ECHO     Pulmonary  AHRF 2/2 CAP vs aspiration PNA   CT chest show bilateral ground glass opacities, significant in left upper and lower lobe  Follow pending MRSA nares  complete Unasyn 3 g q6 x 5 days     Pleural Nodule  5 mm pleural based nodule  Not documented on previous imaging  Recommend OP follow-up      Renal  Hypotonic Euvolemic Hyponatremia 2/2 poor oral intake  Unlikely beer potomania given urine osm >100  Discontinue D5W  Continue to monitor sodium levels BMP q4h x4  Continue fluid restriction,1 L    Endocrine  Hx DM  Per patient was dx 3 years ago  HbA1c 5.8  Hx LE paraesthesia 2/2 DM   Continue LDSS  Hypoglycemia protocol  Follow BG AC/HS      Infection  CAP vs Aspiration PNA  CT chest findings show Multifocal bilateral ground-glass pneumonia more prominent within the left upper lobe and left lower lobe  Negative cultures results so far  Complete Unasyn dose 12/02/22     Heme/Onc  Thrombocytopenia, likely reactive in setting of infection   Low HIT score, normal INR  Monitor plts       Psych  Hx Alcohol abuse  Reported last drink was 4 days ago  Reports drinking a 6 pack daily   Lipase levels on admission 96  Continue on CIWA protocol  Continue thiamine and folic acid      Hx Tobacco use (chewing tobacco)      Jayde Bates, DO, FACP, FCCP, [facility],

## 2022-11-30 VITALS
SYSTOLIC BLOOD PRESSURE: 142 MMHG | RESPIRATION RATE: 16 BRPM | HEIGHT: 72 IN | HEART RATE: 79 BPM | OXYGEN SATURATION: 95 % | DIASTOLIC BLOOD PRESSURE: 84 MMHG | WEIGHT: 209 LBS | BODY MASS INDEX: 28.31 KG/M2 | TEMPERATURE: 98.4 F

## 2022-11-30 LAB
ALBUMIN SERPL-MCNC: 3.1 G/DL (ref 3.5–5.2)
ALP BLD-CCNC: 107 U/L (ref 40–129)
ALT SERPL-CCNC: 73 U/L (ref 0–40)
ANION GAP SERPL CALCULATED.3IONS-SCNC: 12 MMOL/L (ref 7–16)
AST SERPL-CCNC: 74 U/L (ref 0–39)
BASOPHILS ABSOLUTE: 0.02 E9/L (ref 0–0.2)
BASOPHILS RELATIVE PERCENT: 0.4 % (ref 0–2)
BILIRUB SERPL-MCNC: 0.6 MG/DL (ref 0–1.2)
BUN BLDV-MCNC: 10 MG/DL (ref 6–20)
CALCIUM SERPL-MCNC: 8.7 MG/DL (ref 8.6–10.2)
CHLORIDE BLD-SCNC: 100 MMOL/L (ref 98–107)
CO2: 23 MMOL/L (ref 22–29)
CREAT SERPL-MCNC: 0.7 MG/DL (ref 0.7–1.2)
EKG ATRIAL RATE: 97 BPM
EKG P AXIS: 96 DEGREES
EKG P-R INTERVAL: 206 MS
EKG Q-T INTERVAL: 404 MS
EKG QRS DURATION: 148 MS
EKG QTC CALCULATION (BAZETT): 513 MS
EKG R AXIS: -44 DEGREES
EKG T AXIS: 93 DEGREES
EKG VENTRICULAR RATE: 97 BPM
EOSINOPHILS ABSOLUTE: 0.13 E9/L (ref 0.05–0.5)
EOSINOPHILS RELATIVE PERCENT: 2.9 % (ref 0–6)
GFR SERPL CREATININE-BSD FRML MDRD: >60 ML/MIN/1.73
GLUCOSE BLD-MCNC: 110 MG/DL (ref 74–99)
HCT VFR BLD CALC: 28.8 % (ref 37–54)
HEMOGLOBIN: 10.1 G/DL (ref 12.5–16.5)
IMMATURE GRANULOCYTES #: 0.04 E9/L
IMMATURE GRANULOCYTES %: 0.9 % (ref 0–5)
LYMPHOCYTES ABSOLUTE: 0.9 E9/L (ref 1.5–4)
LYMPHOCYTES RELATIVE PERCENT: 20 % (ref 20–42)
MCH RBC QN AUTO: 32 PG (ref 26–35)
MCHC RBC AUTO-ENTMCNC: 35.1 % (ref 32–34.5)
MCV RBC AUTO: 91.1 FL (ref 80–99.9)
METER GLUCOSE: 111 MG/DL (ref 74–99)
MONOCYTES ABSOLUTE: 0.47 E9/L (ref 0.1–0.95)
MONOCYTES RELATIVE PERCENT: 10.4 % (ref 2–12)
NEUTROPHILS ABSOLUTE: 2.94 E9/L (ref 1.8–7.3)
NEUTROPHILS RELATIVE PERCENT: 65.4 % (ref 43–80)
PDW BLD-RTO: 12.4 FL (ref 11.5–15)
PLATELET # BLD: 106 E9/L (ref 130–450)
PMV BLD AUTO: 9 FL (ref 7–12)
POTASSIUM SERPL-SCNC: 4.1 MMOL/L (ref 3.5–5)
RBC # BLD: 3.16 E12/L (ref 3.8–5.8)
SODIUM BLD-SCNC: 135 MMOL/L (ref 132–146)
TOTAL PROTEIN: 6.6 G/DL (ref 6.4–8.3)
WBC # BLD: 4.5 E9/L (ref 4.5–11.5)

## 2022-11-30 PROCEDURE — 36415 COLL VENOUS BLD VENIPUNCTURE: CPT

## 2022-11-30 PROCEDURE — 6370000000 HC RX 637 (ALT 250 FOR IP): Performed by: INTERNAL MEDICINE

## 2022-11-30 PROCEDURE — 82962 GLUCOSE BLOOD TEST: CPT

## 2022-11-30 PROCEDURE — 80053 COMPREHEN METABOLIC PANEL: CPT

## 2022-11-30 PROCEDURE — 2580000003 HC RX 258: Performed by: INTERNAL MEDICINE

## 2022-11-30 PROCEDURE — 85025 COMPLETE CBC W/AUTO DIFF WBC: CPT

## 2022-11-30 RX ORDER — LANOLIN ALCOHOL/MO/W.PET/CERES
100 CREAM (GRAM) TOPICAL DAILY
Qty: 30 TABLET | Refills: 3 | Status: SHIPPED | OUTPATIENT
Start: 2022-12-04

## 2022-11-30 RX ORDER — FOLIC ACID 1 MG/1
1 TABLET ORAL DAILY
Qty: 30 TABLET | Refills: 3 | Status: SHIPPED | OUTPATIENT
Start: 2022-11-30

## 2022-11-30 RX ORDER — AMOXICILLIN AND CLAVULANATE POTASSIUM 875; 125 MG/1; MG/1
1 TABLET, FILM COATED ORAL 2 TIMES DAILY
Qty: 6 TABLET | Refills: 0 | Status: SHIPPED | OUTPATIENT
Start: 2022-11-30 | End: 2022-12-03

## 2022-11-30 RX ADMIN — MULTIVITAMIN TABLET 1 TABLET: TABLET at 08:32

## 2022-11-30 RX ADMIN — ATORVASTATIN CALCIUM 40 MG: 40 TABLET, FILM COATED ORAL at 08:32

## 2022-11-30 RX ADMIN — SODIUM CHLORIDE, PRESERVATIVE FREE 10 ML: 5 INJECTION INTRAVENOUS at 08:32

## 2022-11-30 RX ADMIN — Medication 1000 UNITS: at 08:32

## 2022-11-30 RX ADMIN — FOLIC ACID 1 MG: 1 TABLET ORAL at 08:32

## 2022-11-30 ASSESSMENT — PAIN SCALES - GENERAL: PAINLEVEL_OUTOF10: 0

## 2022-11-30 NOTE — DISCHARGE SUMMARY
Hospitalist Discharge Summary    Patient ID: Clayton Merlin   Patient : 1965  Patient's PCP: Connie Kauffman DO    Admit Date: 2022   Admitting Physician: Destiny Glass MD    Discharge Date:  2022  Discharge Physician: Amarilys Bird MD   Discharge Condition: Stable  Discharge Disposition: Home    History of presenting illness:  59years old male patient who has history of hypertension diabetes and daily alcohol intake, consuming 12 pack every day, patient apparently had multiple episodes of syncope at home he was brought into the emergency department CT head was negative CT chest was negative for pulmonary embolism but showed multifocal pneumonia bilaterally, patient respiratory viral panel including COVID-19 testing was negative. Patient was noted to have severe hyponatremia. He was requiring high flow nasal cannula in emergency department, initial sodium was 123 patient was given D5W normal saline emergency department, repeat sodium was 119, patient is being admitted to intensive care unit on hospital medicine service. In ICU, his sodium levels improved to 129 on the morning of . Cardiology recommended holding antihypertensives and obtain echocardiogram.  Neurology recommended no further intervention as most likely cause for patient's syncope/seizure was metabolic. Echocardiogram normal EF. Syncope likely due to soft blood pressures/hypotension. Patient is blood pressure medications was discontinued.     Hospital course in brief:  (Please refer to daily progress notes for a comprehensive review of the hospitalization by requesting medical records)  As above    Consults:   IP CONSULT TO NEPHROLOGY  IP CONSULT TO SOCIAL WORK  IP CONSULT TO HOSPITALIST  IP CONSULT TO CRITICAL CARE  IP CONSULT TO NEUROLOGY  IP CONSULT TO CARDIOLOGY  IP CONSULT TO CRITICAL CARE  IP CONSULT TO SOCIAL WORK    Physical exam  General appearance: No apparent distress, appears stated age and cooperative. HEENT:  Conjunctivae/corneas clear. Neck: Supple. No jugular venous distention. Respiratory: Clear to auscultation bilaterally, normal respiratory effort  Cardiovascular: Regular rate rhythm, normal S1-S2  Abdomen: Soft, nontender, nondistended  Musculoskeletal: No clubbing, cyanosis, no bilateral lower extremity edema. Brisk capillary refill. Skin:  No rashes  on visible skin  Neurologic: awake, alert and following commands        Discharge Diagnoses:  1. Acute hypoxic respiratory failure  2. Syncope likely due to dehydration low blood pressure  3. Hyponatremia  4. Aspiration pneumonia  5. Thrombocytopenia likely from underlying infection and alcohol use  6. Alcohol abuse with concern for withdrawal  7. Coronary artery disease  8. Type 2 diabetes    Discharge Instructions / Follow up:    Continued appropriate risk factor modification of blood pressure, diabetes and serum lipids will remain essential to reducing risk of future atherosclerotic development    Activity: activity as tolerated    Significant labs:  CBC:   Recent Labs     11/28/22  0438 11/29/22  0443 11/30/22  0510   WBC 7.7 6.5 4.5   RBC 3.50* 3.21* 3.16*   HGB 11.3* 10.3* 10.1*   HCT 31.3* 29.3* 28.8*   MCV 89.4 91.3 91.1   RDW 12.3 12.3 12.4   PLT 91* 98* 106*     BMP:   Recent Labs     11/27/22  0925 11/27/22  1301 11/29/22  1712 11/29/22  2053 11/30/22  0510   *   < > 132 129* 135   K 3.3*   < > 3.8 4.0 4.1   CL 89*   < > 97* 95* 100   CO2 27   < > 25 24 23   BUN 12   < > 12 12 10   CREATININE 0.9   < > 0.7 0.7 0.7   MG 2.0  --   --   --   --    PHOS 3.1  --   --   --   --     < > = values in this interval not displayed.      LFT:  Recent Labs     11/27/22  1051 11/28/22  0438 11/29/22  0443 11/30/22  0510   PROT  --  6.3* 6.9 6.6   ALKPHOS  --  79 100 107   ALT  --  59* 71* 73*   AST  --  80* 89* 74*   BILITOT  --  0.7 0.6 0.6   LIPASE 57  --   --   --      PT/INR:   Recent Labs     11/27/22  1051   INR 1.4   APTT 38.0*     BNP: No results for input(s): BNP in the last 72 hours. Hgb A1C:   Lab Results   Component Value Date    LABA1C 5.8 (H) 11/27/2022     Folate and B12:   Lab Results   Component Value Date    IDIGQMYU68 791 01/16/2012   ,   Lab Results   Component Value Date    FOLATE 11.3 01/16/2012     Thyroid Studies:   Lab Results   Component Value Date    TSH 0.577 11/27/2022       Urinalysis:    Lab Results   Component Value Date/Time    NITRU Negative 11/26/2022 02:46 PM    [address] NONE 11/26/2022 02:46 PM    BACTERIA RARE 11/26/2022 02:46 PM    RBCUA 0-1 11/26/2022 02:46 PM    BLOODU Negative 11/26/2022 02:46 PM    SPECGRAV 1.015 11/26/2022 02:46 PM    GLUCOSEU Negative 11/26/2022 02:46 PM       Imaging:  CT HEAD WO CONTRAST    Result Date: 11/26/2022  EXAMINATION: CT OF THE HEAD WITHOUT CONTRAST  11/26/2022 2:56 pm TECHNIQUE: CT of the head was performed without the administration of intravenous contrast. Automated exposure control, iterative reconstruction, and/or weight based adjustment of the mA/kV was utilized to reduce the radiation dose to as low as reasonably achievable. COMPARISON: None. HISTORY: ORDERING SYSTEM PROVIDED HISTORY: syncope TECHNOLOGIST PROVIDED HISTORY: Has a \"code stroke\" or \"stroke alert\" been called? ->No Reason for exam:->syncope Decision Support Exception - unselect if not a suspected or confirmed emergency medical condition->Emergency Medical Condition (MA) What reading provider will be dictating this exam?->CRC FINDINGS: BRAIN/VENTRICLES: There is no acute intracranial hemorrhage, mass effect or midline shift. No abnormal extra-axial fluid collection. The gray-white differentiation is maintained without evidence of an acute infarct. There is no evidence of hydrocephalus. ORBITS: The visualized portion of the orbits demonstrate no acute abnormality. SINUSES: The visualized paranasal sinuses and mastoid air cells demonstrate no acute abnormality.  SOFT TISSUES/SKULL:  No acute abnormality of the visualized skull or soft tissues. No acute intracranial abnormality. CT ABDOMEN PELVIS W IV CONTRAST Additional Contrast? None    Result Date: 11/26/2022  EXAMINATION: CT OF THE ABDOMEN AND PELVIS WITH CONTRAST 11/26/2022 2:56 pm TECHNIQUE: CT of the abdomen and pelvis was performed with the administration of intravenous contrast. Multiplanar reformatted images are provided for review. Automated exposure control, iterative reconstruction, and/or weight based adjustment of the mA/kV was utilized to reduce the radiation dose to as low as reasonably achievable. COMPARISON: 10/14/2014. HISTORY: ORDERING SYSTEM PROVIDED HISTORY: epigastric pain, syncope TECHNOLOGIST PROVIDED HISTORY: Reason for exam:->epigastric pain, syncope Additional Contrast?->None Decision Support Exception - unselect if not a suspected or confirmed emergency medical condition->Emergency Medical Condition (MA) What reading provider will be dictating this exam?->CRC FINDINGS: Lower Chest: Limited evaluation of the lower lung fields demonstrates mild prominence of the bronchovascular markings with airspace opacification visualized most prominent in the in lower lung fields bilaterally at in the left upper lung field, no evidence of focal consolidation is seen. Bronchial wall thickening is seen recommend clinical correlation for COVID-19 disease or pneumonia. No evidence of acute cardiopulmonary disease is seen. Organs: The liver demonstrates unremarkable attenuation, no evidence of masses no evidence of intrahepatic biliary dilatation is seen. The gallbladder is unremarkable, no evidence of gallbladder wall thickening or pericholecystic  stranding. The common bile duct is unremarkable. The pancreas and spleen demonstrate no evidence of masses. The adrenal glands demonstrate unremarkable contours with no evidence of masses. The kidneys demonstrate no evidence of stones no evidence of renal masses.  No evidence of hydronephrosis or hydroureter is seen. GI/Bowel: The stomach is unremarkable, no evidence of masses. No significant distention of the small and large bowel loops is visualized. The appendix is visualized and is unremarkable. Pelvis: The urinary bladder is not optimally distended. Mild apparent bladder wall thickening is seen. The prostate gland is unremarkable. No evidence of free fluid in the pelvis. No evidence of pelvic mass is seen. Subtle inguinal hernias seen. Peritoneum/Retroperitoneum: No evidence of free fluid or air within the peritoneal cavity. Bones/Soft Tissues: Abdominal wall soft tissues are unremarkable. Degenerative changes of the lumbar spine visualized. No evidence of acute abdominal/pelvic pathology is seen. Airspace opacification visualized in the lung fields would recommend clinical correlation for COVID-19 disease or pneumonia. XR CHEST PORTABLE    Result Date: 11/29/2022  EXAMINATION: ONE XRAY VIEW OF THE CHEST 11/29/2022 10:42 am COMPARISON: 11/28/2022 HISTORY: ORDERING SYSTEM PROVIDED HISTORY: r/o pneumonia TECHNOLOGIST PROVIDED HISTORY: Reason for exam:->r/o pneumonia What reading provider will be dictating this exam?->CRC FINDINGS: Stable patchy opacity left lower lobe. There is no effusion or pneumothorax. The cardiomediastinal silhouette is without acute process. The osseous structures are without acute process. Stable patchy opacity left lower lobe. XR CHEST PORTABLE    Result Date: 11/28/2022  EXAMINATION: ONE XRAY VIEW OF THE CHEST 11/28/2022 7:32 am COMPARISON: 11/27/2022 HISTORY: ORDERING SYSTEM PROVIDED HISTORY: r/o pneumonia TECHNOLOGIST PROVIDED HISTORY: Reason for exam:->r/o pneumonia What reading provider will be dictating this exam?->CRC FINDINGS: Possible developing left lower lobe opacity. There is no effusion or pneumothorax. The cardiomediastinal silhouette is without acute process. The osseous structures are without acute process.      Possible developing left lower lobe opacity. XR CHEST PORTABLE    Result Date: 11/27/2022  EXAMINATION: ONE XRAY VIEW OF THE CHEST 11/27/2022 10:27 am COMPARISON: None. HISTORY: ORDERING SYSTEM PROVIDED HISTORY: r/o pneumonia TECHNOLOGIST PROVIDED HISTORY: Reason for exam:->r/o pneumonia What reading provider will be dictating this exam?->CRC FINDINGS: The lungs are without acute focal process. There is no effusion or pneumothorax. The cardiomediastinal silhouette is without acute process. The osseous structures are without acute process. No acute process. CTA PULMONARY W CONTRAST    Result Date: 11/26/2022  EXAMINATION: CTA OF THE CHEST 11/26/2022 2:56 pm TECHNIQUE: CTA of the chest was performed after the administration of intravenous contrast.  Multiplanar reformatted images are provided for review. MIP images are provided for review. Automated exposure control, iterative reconstruction, and/or weight based adjustment of the mA/kV was utilized to reduce the radiation dose to as low as reasonably achievable. COMPARISON: None. HISTORY: ORDERING SYSTEM PROVIDED HISTORY: multiple syncopal episodes, r/o pe TECHNOLOGIST PROVIDED HISTORY: Reason for exam:->multiple syncopal episodes, r/o pe Decision Support Exception - unselect if not a suspected or confirmed emergency medical condition->Emergency Medical Condition (MA) What reading provider will be dictating this exam?->CRC FINDINGS: Pulmonary Arteries: Pulmonary arteries are adequately opacified for evaluation. No evidence of intraluminal filling defect to suggest pulmonary embolism. Main pulmonary artery is normal in caliber. Mediastinum: No evidence of mediastinal lymphadenopathy. The heart and pericardium demonstrate no acute abnormality. There is no acute abnormality of the thoracic aorta. Lungs/pleura: Ground-glass airspace disease is seen within the left upper lobe and left lower lobe.   There is also more vague ground-glass airspace disease seen within Atherosclerotic plaque burden is also mild in the left carotid bulb. Visually the degree of luminal narrowing is less than 25%. ICA/CCA ratio of 1.0     The right internal carotid artery demonstrates 0-50% stenosis based on flow velocities. The left internal carotid artery demonstrates 0-50% stenosis based on flow velocities. Bilateral vertebral arteries are patent with flow in the normal direction. Mild atherosclerotic plaque burden in the bilateral carotid bulbs. Visually the degree of luminal narrowing is less than 25%.        Discharge Medications:      Medication List        START taking these medications      amoxicillin-clavulanate 875-125 MG per tablet  Commonly known as: AUGMENTIN  Take 1 tablet by mouth 2 times daily for 3 days     folic acid 1 MG tablet  Commonly known as: FOLVITE  Take 1 tablet by mouth daily     thiamine 100 MG tablet  Take 1 tablet by mouth daily  Start taking on: December 4, 2022            Rafa Harry taking these medications      atorvastatin 40 MG tablet  Commonly known as: LIPITOR     Vitamin D3 25 MCG (1000 UT) Caps            STOP taking these medications      amLODIPine 10 MG tablet  Commonly known as: NORVASC     lisinopril 20 MG tablet  Commonly known as: PRINIVIL;ZESTRIL     lisinopril-hydroCHLOROthiazide 20-25 MG per tablet  Commonly known as: PRINZIDE;ZESTORETIC     metoprolol succinate 50 MG extended release tablet  Commonly known as: TOPROL XL               Where to Get Your Medications        These medications were sent to [address] [place] [phone] - F [phone]  [address]      Phone: [phone]   amoxicillin-clavulanate 875-125 MG per tablet  folic acid 1 MG tablet  thiamine 100 MG tablet         Time Spent on discharge is more than 35 minutes in the examination, evaluation, counseling and review of medications and discharge plan.    +++++++++++++++++++++++++++++++++++++++++++++++++  Trent Sánchez MD  [facility] - [address] [place] [address]  +++++++++++++++++++++++++++++++++++++++++++++++++  NOTE: This report was transcribed using voice recognition software. Every effort was made to ensure accuracy; however, inadvertent computerized transcription errors may be present.

## 2022-11-30 NOTE — CARE COORDINATION
Chart reviewed and case reviewed in IDR and with Dr Drew Patrick. Patient to discharge to home today. PO Augmentin has been called down to the outpatient pharmacy in case the patient needs help with medication assistance. Bedside nurse Marlene Valero notified of this and she will reach out to [facility] to [facility] as patient stated that his ride will be here at 10:30 to pick him up to go home. Anticipate discharge to home this morning.      Laxmi Yan RN.  [facility]  [phone]

## 2022-11-30 NOTE — PROGRESS NOTES
PROGRESS NOTE     CARDIOLOGY    Chief complaint: Seen today for follow up, management & recommendations for following. He denies chest pain or shortness of breath today. He was reclining in bed. He was comfortable and in no distress. Wt Readings from Last 3 Encounters:   11/29/22 210 lb 8 oz (95.5 kg)   04/20/19 234 lb (106.1 kg)     Temp Readings from Last 3 Encounters:   11/29/22 97.7 °F (36.5 °C) (Temporal)   11/23/20 96.8 °F (36 °C) (Temporal)     BP Readings from Last 3 Encounters:   11/29/22 124/64   11/23/20 (!) 148/105   04/20/19 (!) 143/76     Pulse Readings from Last 3 Encounters:   11/29/22 81   11/23/20 99   04/20/19 69         Intake/Output Summary (Last 24 hours) at 11/29/2022 1904  Last data filed at 11/29/2022 0347  Gross per 24 hour   Intake 700 ml   Output --   Net 700 ml       Recent Labs     11/27/22  1051 11/28/22  0438 11/29/22  0443   WBC 9.7 7.7 6.5   HGB 12.2* 11.3* 10.3*   HCT 33.3* 31.3* 29.3*   MCV 86.9 89.4 91.3   * 91* 98*     Recent Labs     11/26/22  1948 11/27/22  0053 11/27/22  0925 11/27/22  1301 11/29/22  1010 11/29/22  1316 11/29/22  1712   *   < > 129*   < > 131* 130* 132   K 3.2*   < > 3.3*   < > 3.9 3.9 3.8   CL 81*   < > 89*   < > 95* 96* 97*   CO2 22 < > 27   < > 26 22 25   PHOS  --   --  3.1  --   --   --   --    BUN 11   < > 12   < > 11 12 12   CREATININE 1.0   < > 0.9   < > 0.9 0.8 0.7   MG 1.9  --  2.0  --   --   --   --     < > = values in this interval not displayed. Recent Labs     11/27/22  1051   PROTIME 15.5*   INR 1.4     No results for input(s): CKTOTAL, CKMB, CKMBINDEX, TROPONINI in the last 72 hours. No results for input(s): BNP in the last 72 hours. No results for input(s): CHOL, HDL, TRIG in the last 72 hours. Invalid input(s): CHOLHDLR, LDLCALCU  No results for input(s): TROPHS in the last 72 hours.         folic acid (FOLVITE) tablet 1 mg, Daily  sodium chloride flush 0.9 % injection 5-40 mL, 2 times per day  0.9 % sodium chloride infusion, PRN  thiamine (B-1) 250 mg in sodium chloride 0.9 % 100 mL IVPB, Q24H   Followed by  Darvin Zheng ON 12/4/2022] thiamine tablet 100 mg, Daily  trimethobenzamide (TIGAN) injection 200 mg, Q6H PRN  perflutren lipid microspheres (DEFINITY) injection 1.5 mL, ONCE PRN  sodium chloride flush 0.9 % injection 5-40 mL, 2 times per day  multivitamin 1 tablet, Daily  LORazepam (ATIVAN) tablet 1 mg, Q1H PRN   Or  LORazepam (ATIVAN) injection 1 mg, Q1H PRN   Or  LORazepam (ATIVAN) tablet 2 mg, Q1H PRN   Or  LORazepam (ATIVAN) injection 2 mg, Q1H PRN   Or  LORazepam (ATIVAN) tablet 3 mg, Q1H PRN   Or  LORazepam (ATIVAN) injection 3 mg, Q1H PRN   Or  LORazepam (ATIVAN) tablet 4 mg, Q1H PRN   Or  LORazepam (ATIVAN) injection 4 mg, Q1H PRN  enoxaparin (LOVENOX) injection 40 mg, Daily  polyethylene glycol (GLYCOLAX) packet 17 g, Daily PRN  acetaminophen (TYLENOL) tablet 650 mg, Q6H PRN   Or  acetaminophen (TYLENOL) suppository 650 mg, Q6H PRN  [Held by provider] amLODIPine (NORVASC) tablet 10 mg, QAM  atorvastatin (LIPITOR) tablet 40 mg, QAM  Vitamin D (CHOLECALCIFEROL) tablet 1,000 Units, QAM  [Held by provider] metoprolol succinate (TOPROL XL) extended release tablet 50 mg, QAM  insulin lispro (HUMALOG) injection vial 0-4 Units, TID WC  insulin lispro (HUMALOG) injection vial 0-4 Units, Nightly  glucose chewable tablet 16 g, PRN  dextrose bolus 10% 125 mL, PRN   Or  dextrose bolus 10% 250 mL, PRN  glucagon (rDNA) injection 1 mg, PRN  dextrose 10 % infusion, Continuous PRN      Review of systems:     Heart: as above   Lungs: as above   Eyes: denies changes in vision or discharge. Ears: denies changes in hearing or pain. Nose: denies epistaxis or masses   Throat: denies sore throat or trouble swallowing. Neuro: denies numbness, tingling, tremors. Skin: denies rashes or itching. : denies hematuria, dysuria   GI: denies vomiting, diarrhea   Psych: denies mood changed, anxiety, depression.          Physical exam:    Constitutional: A&O x3, communicates well, no acute distress. Eyes: extraocular muscles intact, PERRL. Normal lids & conjunctiva. No icterus. ENT: clear, no bleeding. No external masses. Lips normal formation. Neck: Bearded. Supple, full ROM, no JVD, no bruits, no lymphadenopathy. No masses. trachea midline. Heart: Distant. Regular rate & rhythm, normal S1 & S2, no abnormal murmurs. No heave. Lungs: CTA. No accessory muscles. Abd: soft, non-tender. Normal bowel sounds. Neuro: Full ROM X 4, EOMI, no tremors. EXT: No bilateral lower extremity edema on exam.  Skin: warm, dry, intact. Good turgor. Psych: A&O x 3, normal behavior, not anxious. Assessment/Recommendations  Falling. He denies any syncope. Alcohol abuse. Echo shows normal LV systolic function and no valvular abnormalities. I discussed the echo results with him. Former smoker. Now chewing tobacco.  Diabetes  Hypertension. Running low this admission. Possibly due to the fluid restriction. Multiple severe electrolyte abnormalities. I will replace the potassium today but will defer further to others. No active cardiac issues. Cardiology will sign off. Note: This report was completed using computerized voice recognition software. Every effort has been made to ensure accuracy, however; and invert and computerized transcription errors may be present.

## 2022-12-01 LAB
BLOOD CULTURE, ROUTINE: NORMAL
CULTURE, BLOOD 2: NORMAL

## 2022-12-07 ENCOUNTER — TELEPHONE (OUTPATIENT)
Dept: CARDIOLOGY CLINIC | Age: 57
End: 2022-12-07

## 2025-06-07 ENCOUNTER — HOSPITAL ENCOUNTER (EMERGENCY)
Age: 60
Discharge: HOME | End: 2025-06-08
Attending: STUDENT IN AN ORGANIZED HEALTH CARE EDUCATION/TRAINING PROGRAM

## 2025-06-07 DIAGNOSIS — F10.920 ACUTE ALCOHOLIC INTOXICATION WITHOUT COMPLICATION: ICD-10-CM

## 2025-06-07 DIAGNOSIS — F39 MOOD DISORDER: Primary | ICD-10-CM

## 2025-06-07 LAB
ALBUMIN SERPL-MCNC: 3.8 G/DL (ref 3.5–5.2)
ALP SERPL-CCNC: 125 U/L (ref 40–129)
ALT SERPL-CCNC: 32 U/L (ref 0–50)
AMPHET UR QL SCN: NEGATIVE
ANION GAP SERPL CALCULATED.3IONS-SCNC: 15 MMOL/L (ref 7–16)
APAP SERPL-MCNC: <5 UG/ML (ref 10–30)
AST SERPL-CCNC: 65 U/L (ref 0–50)
BARBITURATES UR QL SCN: NEGATIVE
BASOPHILS # BLD: 0 K/UL (ref 0–0.2)
BASOPHILS NFR BLD: 0 % (ref 0–2)
BENZODIAZ UR QL: NEGATIVE
BILIRUB SERPL-MCNC: 0.5 MG/DL (ref 0–1.2)
BUN SERPL-MCNC: 4 MG/DL (ref 8–23)
BUPRENORPHINE UR QL: NEGATIVE
CALCIUM SERPL-MCNC: 8.9 MG/DL (ref 8.8–10.2)
CANNABINOIDS UR QL SCN: NEGATIVE
CHLORIDE SERPL-SCNC: 95 MMOL/L (ref 98–107)
CO2 SERPL-SCNC: 20 MMOL/L (ref 22–29)
COCAINE UR QL SCN: NEGATIVE
CREAT SERPL-MCNC: 0.7 MG/DL (ref 0.7–1.2)
EOSINOPHIL # BLD: 0.18 K/UL (ref 0.05–0.5)
EOSINOPHILS RELATIVE PERCENT: 4 % (ref 0–6)
ERYTHROCYTE [DISTWIDTH] IN BLOOD BY AUTOMATED COUNT: 12.1 % (ref 11.5–15)
ETHANOLAMINE SERPL-MCNC: 295 MG/DL (ref 0–0.08)
FENTANYL UR QL: NEGATIVE
GFR, ESTIMATED: >90 ML/MIN/1.73M2
GLUCOSE SERPL-MCNC: 112 MG/DL (ref 74–99)
HCT VFR BLD AUTO: 41.4 % (ref 37–54)
HGB BLD-MCNC: 14.3 G/DL (ref 12.5–16.5)
LYMPHOCYTES NFR BLD: 1.72 K/UL (ref 1.5–4)
LYMPHOCYTES RELATIVE PERCENT: 33 % (ref 20–42)
MCH RBC QN AUTO: 31.7 PG (ref 26–35)
MCHC RBC AUTO-ENTMCNC: 34.5 G/DL (ref 32–34.5)
MCV RBC AUTO: 91.8 FL (ref 80–99.9)
METHADONE UR QL: NEGATIVE
MONOCYTES NFR BLD: 0.23 K/UL (ref 0.1–0.95)
MONOCYTES NFR BLD: 4 % (ref 2–12)
NEUTROPHILS NFR BLD: 59 % (ref 43–80)
NEUTS SEG NFR BLD: 3.07 K/UL (ref 1.8–7.3)
OPIATES UR QL SCN: NEGATIVE
OXYCODONE UR QL SCN: NEGATIVE
PCP UR QL SCN: NEGATIVE
PLATELET # BLD AUTO: 133 K/UL (ref 130–450)
PMV BLD AUTO: 9 FL (ref 7–12)
POTASSIUM SERPL-SCNC: 5 MMOL/L (ref 3.5–5.1)
PROT SERPL-MCNC: 8 G/DL (ref 6.4–8.3)
RBC # BLD AUTO: 4.51 M/UL (ref 3.8–5.8)
RBC # BLD: NORMAL 10*6/UL
RBC # BLD: NORMAL 10*6/UL
SALICYLATES SERPL-MCNC: <0.5 MG/DL (ref 0–30)
SODIUM SERPL-SCNC: 130 MMOL/L (ref 136–145)
TEST INFORMATION: NORMAL
TOXIC TRICYCLIC SC,BLOOD: NEGATIVE
WBC OTHER # BLD: 5.2 K/UL (ref 4.5–11.5)

## 2025-06-07 PROCEDURE — 93005 ELECTROCARDIOGRAM TRACING: CPT | Performed by: EMERGENCY MEDICINE

## 2025-06-07 PROCEDURE — 80179 DRUG ASSAY SALICYLATE: CPT

## 2025-06-07 PROCEDURE — G0480 DRUG TEST DEF 1-7 CLASSES: HCPCS

## 2025-06-07 PROCEDURE — 80307 DRUG TEST PRSMV CHEM ANLYZR: CPT

## 2025-06-07 PROCEDURE — 85025 COMPLETE CBC W/AUTO DIFF WBC: CPT

## 2025-06-07 PROCEDURE — 80053 COMPREHEN METABOLIC PANEL: CPT

## 2025-06-07 PROCEDURE — 99284 EMERGENCY DEPT VISIT MOD MDM: CPT

## 2025-06-07 PROCEDURE — 80143 DRUG ASSAY ACETAMINOPHEN: CPT

## 2025-06-07 ASSESSMENT — PAIN - FUNCTIONAL ASSESSMENT: PAIN_FUNCTIONAL_ASSESSMENT: NONE - DENIES PAIN

## 2025-06-07 ASSESSMENT — LIFESTYLE VARIABLES: HOW OFTEN DO YOU HAVE A DRINK CONTAINING ALCOHOL: MONTHLY OR LESS

## 2025-06-08 VITALS
HEIGHT: 72 IN | WEIGHT: 218 LBS | DIASTOLIC BLOOD PRESSURE: 90 MMHG | SYSTOLIC BLOOD PRESSURE: 159 MMHG | RESPIRATION RATE: 16 BRPM | OXYGEN SATURATION: 99 % | BODY MASS INDEX: 29.53 KG/M2 | HEART RATE: 82 BPM | TEMPERATURE: 97.8 F

## 2025-06-08 LAB — ETHANOLAMINE SERPL-MCNC: 94 MG/DL (ref 0–0.08)

## 2025-06-08 PROCEDURE — G0480 DRUG TEST DEF 1-7 CLASSES: HCPCS

## 2025-06-08 NOTE — ED NOTES
discussed with ED physician and is agreeable to overturning to involuntary hold and D/C patient with resources.     Patient confirmed he has no healthcare insurance.     Patient was agreeable to allowing SW contact is friend Alex at [phone] to ask for a ride home. Alex was agreeable to picking patient up in ED entrance and will call from his cell phone at [phone] when he arrives.

## 2025-06-08 NOTE — ED NOTES
Pt awaiting ride from friend to pick him up. Awaiting call as the friend told our social work personal that he was going to call as soon as he rolls in.

## 2025-06-08 NOTE — VIRTUAL HEALTH
Feliciano Pratt, was evaluated through a synchronous (real-time) audio-video encounter. The patient (and/or guardian if applicable) is aware that this is a billable service, which includes applicable co-pays. This virtual visit was conducted with patient's (and/or legal guardian's) consent. Patient identification was verified, and a caregiver was present when appropriate.  The patient was located at Facility (Appt Department): [facility] EMERGENCY DEPARTMENT  [address] [id number]  Loc: [phone]  The provider was located at Home (City/State): [place], [place]  Confirm you are appropriately licensed, registered, or certified to deliver care in the state where the patient is located as indicated above. If you are not or unsure, please re-schedule the visit: Yes, I confirm.   [place] Consult to Tele-Psych  Consult performed by: Monie Alfaro APRN - CNP  Consult ordered by: Wiliam Fiore MD  Reason for consult: alcohol intoxication and suicidal statements    Feliciano Pratt  [id number]  1965     EMERGENCY DEPARTMENT TELEPSYCHIATRY EVALUATION    06/08/25    Chief Complaint:  “I want to go home. I didn't mean it.”    HPI: Patient is a 60 y.o.  male who presents for alcohol intoxication as well as suicidal statements. Patient presented to the ED on 06/08/25 from home.  Patient states the ambulance showed up to take him to the hospital.    Patient is alert and oriented. He does agree to psychiatric evaluation at this time. He is able to answer questions appropriately and is cooperative with examination.    Patient states he was watching a basketball game. He states he was talking to his ex. He told her watch what happens if I loose this game I am going to commit suicide. Patient states he was just using a turn of phrase and did not actually meant that he was going to kill himself.     Patient states he does not know

## 2025-06-08 NOTE — VIRTUAL HEALTH
Received request for Telepsychiatry evaluation.  Medical screening labs are still pending.  Our team will continue to follow and will initiate evaluation when more information is available         Feliciano Pratt, was evaluated through a synchronous (real-time) audio-video encounter. The patient (and/or guardian if applicable) is aware that this is a billable service, which includes applicable co-pays. This virtual visit was conducted with patient's (and/or legal guardian's) consent. Patient identification was verified, and a caregiver was present when appropriate.  The patient was located at Facility (Appt Department): [facility] EMERGENCY DEPARTMENT  [address] [address]  Loc: [phone]  The provider was located at [place]  Confirm you are appropriately licensed, registered, or certified to deliver care in the [facility] where the patient is located as indicated above. If you are not or unsure, please re-schedule the visit: Yes, I confirm.   Consults     Total time spent on this encounter: Not billed by time    --WAQAS Wheeler on 6/7/2025 at 9:23 PM    An electronic signature was used to authenticate this note.

## 2025-06-08 NOTE — DISCHARGE INSTRUCTIONS
[address], [place] [address]  [phone]    [facility]  [address], [place], [place] [id number] [phone]    [facility]  [address], [place] [address]  [phone]    [facility]  [address], [place], [place] [id number] [phone]    [facility]  [address], [place] [address]  [phone]    [facility]  [address], [place] [address]  [phone]    [facility]  [address] [place] [address]  [phone]  [facility] [phone]    [facility]   [address] [place] [address]  [phone]  [phone]    [facility]  [address] [place] [address]  [phone]    [facility] - [facility] (addiction and/or mental health)  (Office hours 8AM to 4PM) by appointment only   [address] [place] [address]  [phone]

## 2025-06-08 NOTE — ED PROVIDER NOTES
HPI:  6/7/25, Time: 9:11 PM EDT         Feliciano Pratt is a 60 y.o. male history of diabetes history of hypertension presenting to the ED for psychiatric evaluation, beginning 1 day ago.  The complaint has been persistent, moderate in severity, and worsened by nothing.  Patient reportedly was talking to his significant other on the phone and suicidal statement.  She called police.  Patient reporting that he had said this because he wanted to to get back together with her and reportedly had been drinking.  Patient reports he has never tried to hurt himself or others.  He reports no hallucinations reports no fever no chills he reports no chest pain abdominal pain or vomiting is no diarrhea.    ROS:   Pertinent positives and negatives are stated within HPI, all other systems reviewed and are negative.  --------------------------------------------- PAST HISTORY ---------------------------------------------  Past Medical History:  has a past medical history of Diabetes (HCC) and Hypertension.    Past Surgical History:  has a past surgical history that includes Femur fracture surgery; Tonsillectomy; and ECHO Compl W Dop Color Flow (1/17/2012).    Social History:  reports that he has quit smoking. He has never used smokeless tobacco. He reports current alcohol use. He reports that he does not use drugs.    Family History: family history includes Heart Disease in his father.     The patient’s home medications have been reviewed.    Allergies: Patient has no known allergies.    ---------------------------------------------------PHYSICAL EXAM--------------------------------------    Constitutional/General: Alert and oriented x3, well appearing, non toxic in NAD  Head: Normocephalic and atraumatic  Eyes: PERRL, EOMI  Mouth: Oropharynx clear, handling secretions, no trismus  Neck: Supple, full ROM, non tender to palpation in the midline, no stridor, no crepitus, no meningeal signs  Pulmonary: Lungs clear to auscultation

## 2025-06-09 LAB
EKG ATRIAL RATE: 58 BPM
EKG P AXIS: 50 DEGREES
EKG P-R INTERVAL: 260 MS
EKG Q-T INTERVAL: 452 MS
EKG QRS DURATION: 156 MS
EKG QTC CALCULATION (BAZETT): 443 MS
EKG R AXIS: -76 DEGREES
EKG T AXIS: 1 DEGREES
EKG VENTRICULAR RATE: 58 BPM

## 2025-06-09 PROCEDURE — 93010 ELECTROCARDIOGRAM REPORT: CPT | Performed by: INTERNAL MEDICINE
